# Patient Record
Sex: FEMALE | Race: WHITE | NOT HISPANIC OR LATINO | Employment: STUDENT | URBAN - METROPOLITAN AREA
[De-identification: names, ages, dates, MRNs, and addresses within clinical notes are randomized per-mention and may not be internally consistent; named-entity substitution may affect disease eponyms.]

---
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
## 2019-01-07 ENCOUNTER — OFFICE VISIT (OUTPATIENT)
Dept: OBSTETRICS AND GYNECOLOGY | Age: 19
End: 2019-01-07

## 2019-01-07 VITALS
WEIGHT: 138.6 LBS | SYSTOLIC BLOOD PRESSURE: 112 MMHG | DIASTOLIC BLOOD PRESSURE: 78 MMHG | HEIGHT: 66 IN | BODY MASS INDEX: 22.28 KG/M2

## 2019-01-07 DIAGNOSIS — Z11.3 SCREEN FOR STD (SEXUALLY TRANSMITTED DISEASE): ICD-10-CM

## 2019-01-07 DIAGNOSIS — R39.9 UTI SYMPTOMS: ICD-10-CM

## 2019-01-07 DIAGNOSIS — N83.209 CYST OF OVARY, UNSPECIFIED LATERALITY: ICD-10-CM

## 2019-01-07 DIAGNOSIS — R31.9 HEMATURIA, UNSPECIFIED TYPE: ICD-10-CM

## 2019-01-07 DIAGNOSIS — N92.1 MENORRHAGIA WITH IRREGULAR CYCLE: ICD-10-CM

## 2019-01-07 DIAGNOSIS — G89.29 CHRONIC SUPRAPUBIC PAIN: ICD-10-CM

## 2019-01-07 DIAGNOSIS — R10.2 CHRONIC SUPRAPUBIC PAIN: ICD-10-CM

## 2019-01-07 DIAGNOSIS — R10.2 PELVIC PAIN IN FEMALE: ICD-10-CM

## 2019-01-07 DIAGNOSIS — Z00.00 WELL WOMAN EXAM WITHOUT GYNECOLOGICAL EXAM: Primary | ICD-10-CM

## 2019-01-07 LAB
BILIRUB BLD-MCNC: ABNORMAL MG/DL
GLUCOSE UR STRIP-MCNC: NEGATIVE MG/DL
KETONES UR QL: NEGATIVE
LEUKOCYTE EST, POC: NEGATIVE
NITRITE UR-MCNC: NEGATIVE MG/ML
PH UR: 6 [PH] (ref 5–8)
PROT UR STRIP-MCNC: NEGATIVE MG/DL
RBC # UR STRIP: ABNORMAL /UL
SP GR UR: 1.03 (ref 1–1.03)
UROBILINOGEN UR QL: NORMAL

## 2019-01-07 PROCEDURE — 99395 PREV VISIT EST AGE 18-39: CPT | Performed by: OBSTETRICS & GYNECOLOGY

## 2019-01-07 PROCEDURE — 81002 URINALYSIS NONAUTO W/O SCOPE: CPT | Performed by: OBSTETRICS & GYNECOLOGY

## 2019-01-07 NOTE — PROGRESS NOTES
Chief complaint: annual    Subjective   History of Present Illness    Cassy Causey is a 18 y.o.  who presents for annual exam. She has had ongoing lower abdominal pain that she has thought were ovarian cysts. Her last US here 2017 was normal without ovarian cysts. She has had some small cysts noted on CT scan years ago. She takes LoLoestrin for menorrhagia, ovarian cysts, and contraception. She usually has amenorrhea with this pill but does have some spotting. She has seen Urology for kidney stones and has had CT scan that was normal for other issues. She has not had a scope. She usually has hematuria. Review of our labs does not show any urine culture, but UA positive only for blood.  Her menses are absent w/ OCPs.  Soc Hx- [employer] graduate, Freshman in college, going to [employer], plans to transfer to [employer] next year. Plans Art Education. Has a part time job ([employer] store)    Obstetric History:  OB History      Para Term  AB Living    0 0 0 0 0 0    SAB TAB Ectopic Molar Multiple Live Births    0 0 0   0           Menstrual History:     No LMP recorded (lmp unknown).         Current contraception: OCP (estrogen/progesterone)  History of abnormal Pap smear: no  Received Gardasil immunization: yes -    Perform regular self breast exam: yes -    Family history of uterine or ovarian cancer: no  Family History of colon cancer: no  Family history of breast cancer: no    Mammogram: not indicated.  Colonoscopy: not indicated.  DEXA: not indicated.    Exercise: moderately active  Calcium/Vitamin D: adequate intake    The following portions of the patient's history were reviewed and updated as appropriate: allergies, current medications, past family history, past medical history, past social history, past surgical history and problem list.    Review of Systems   Constitutional: Negative for activity change, fatigue, fever and unexpected weight change.   Respiratory: Negative for chest tightness and  "shortness of breath.    Cardiovascular: Negative for chest pain, palpitations and leg swelling.   Gastrointestinal: Negative for abdominal distention, abdominal pain, blood in stool, constipation, diarrhea, nausea and vomiting.   Endocrine: Negative for cold intolerance, heat intolerance, polydipsia, polyphagia and polyuria.   Genitourinary: Positive for dysuria and pelvic pain.        Blood in urine   Musculoskeletal: Negative for arthralgias and back pain.   Skin: Negative for color change.   Neurological: Negative for weakness and headaches.   Hematological: Does not bruise/bleed easily.   Psychiatric/Behavioral: Negative for confusion.       Pertinent items are noted in HPI.     Objective   Physical Exam    /78   Ht 167.6 cm (66\")   Wt 62.9 kg (138 lb 9.6 oz)   LMP  (LMP Unknown)   BMI 22.37 kg/m²     General:   alert, appears stated age and cooperative   Neck: no asymmetry, masses, or scars   Heart: regular rate and rhythm, S1, S2 normal, no murmur, click, rub or gallop   Lungs: clear to auscultation bilaterally   Abdomen: soft, non-tender, without masses or organomegaly, suprapubic tenderness   Breast: inspection negative, no nipple discharge or bleeding, no masses or nodularity palpable   Vulva: normal, Bartholin's, Urethra, [place]'s normal   Vagina: normal mucosa, suprapubic tenderness   Cervix: no cervical motion tenderness, no lesions and nulliparous appearance   Uterus: mobile, non-tender, normal shape and consistency, retroverted   Adnexa: normal adnexa and no mass, fullness, tenderness   Rectal: not indicated     Gyn US today uterus 4.5 x 4 x 2 cm ,EML 2.4mm, normal ovaries, no ovarian cysts noted, very small amt of free fluid in cul de sac  UA negative except blood    Assessment/Plan   Cassy was seen today for follow-up.    Diagnoses and all orders for this visit:    Well woman exam without gynecological exam    Menorrhagia with irregular cycle  -     US Non-ob Transvaginal  -     Norethin-Eth " Estrad-Fe Biphas (LO LOESTRIN FE) 1 MG-10 MCG / 10 MCG tablet; Take 1 tablet by mouth Daily.    Pelvic pain in female  -     US Non-ob Transvaginal  -     Chlamydia trachomatis, Neisseria gonorrhoeae, PCR - Urine, Urine, Clean Catch  -     Norethin-Eth Estrad-Fe Biphas (LO LOESTRIN FE) 1 MG-10 MCG / 10 MCG tablet; Take 1 tablet by mouth Daily.    Cyst of ovary, unspecified laterality  -     US Non-ob Transvaginal    UTI symptoms  -     POC Urinalysis Dipstick  -     Urine Culture - Urine, Urine, Clean Catch    Screen for STD (sexually transmitted disease)    Chronic suprapubic pain    Hematuria, unspecified type    history and exam suggestive of interstitial cystitis. Discussed dx and treatment with pt. Recommend she see Urogyn for evaluation and treatment. Urine culture pending. Samples of Uribel given. Discussed Elmiron medication for treatment. No ovarian cysts noted today.     Breast self exam technique reviewed and patient encouraged to perform self-exam monthly.  Discussed healthy lifestyle modifications.  Pap smear age 20\

## 2019-01-09 ENCOUNTER — TELEPHONE (OUTPATIENT)
Dept: OBSTETRICS AND GYNECOLOGY | Age: 19
End: 2019-01-09

## 2019-01-09 LAB
BACTERIA UR CULT: NORMAL
BACTERIA UR CULT: NORMAL
C TRACH RRNA SPEC QL NAA+PROBE: POSITIVE
N GONORRHOEA RRNA SPEC QL NAA+PROBE: NEGATIVE

## 2019-01-09 NOTE — TELEPHONE ENCOUNTER
----- Message from Rupal Gibson MD sent at 1/9/2019  8:42 AM EST -----  Urine culture negative for UTI

## 2019-01-10 DIAGNOSIS — A74.9 CHLAMYDIA: Primary | ICD-10-CM

## 2019-01-10 RX ORDER — AZITHROMYCIN 500 MG/1
1000 TABLET, FILM COATED ORAL ONCE
Qty: 2 TABLET | Refills: 0 | Status: SHIPPED | OUTPATIENT
Start: 2019-01-10 | End: 2019-01-10

## 2019-01-11 ENCOUNTER — TELEPHONE (OUTPATIENT)
Dept: OBSTETRICS AND GYNECOLOGY | Age: 19
End: 2019-01-11

## 2019-01-11 RX ORDER — AZITHROMYCIN 500 MG/1
1000 TABLET, FILM COATED ORAL ONCE
Qty: 2 TABLET | Refills: 0 | Status: SHIPPED | OUTPATIENT
Start: 2019-01-11 | End: 2019-01-11

## 2019-01-16 ENCOUNTER — TELEPHONE (OUTPATIENT)
Dept: OBSTETRICS AND GYNECOLOGY | Age: 19
End: 2019-01-16

## 2019-01-16 NOTE — TELEPHONE ENCOUNTER
Patient is following up with you from a conversation from last week. Would not leave any more detail.

## 2019-08-09 ENCOUNTER — LAB REQUISITION (OUTPATIENT)
Dept: LAB | Facility: HOSPITAL | Age: 19
End: 2019-08-09

## 2019-08-09 DIAGNOSIS — N20.9 URINARY CALCULUS: ICD-10-CM

## 2019-08-09 PROCEDURE — 82360 CALCULUS ASSAY QUANT: CPT

## 2019-08-16 LAB
CA PHOS CRY STONE QL IR: 5 %
COD CRY STONE QL IR: 25 %
COLOR STONE: NORMAL
COM CRY STONE QL IR: 70 %
COMPN STONE: NORMAL
Lab: NORMAL
Lab: NORMAL
NIDUS STONE QL: NORMAL
PATH REPORT.COMMENTS IMP SPEC: NORMAL
SIZE STONE: NORMAL MM
SURFACE CRYSTALS: NORMAL
WT STONE: 11.3 MG

## 2019-11-05 ENCOUNTER — TELEPHONE (OUTPATIENT)
Dept: OBSTETRICS AND GYNECOLOGY | Age: 19
End: 2019-11-05

## 2019-11-06 ENCOUNTER — OFFICE VISIT (OUTPATIENT)
Dept: OBSTETRICS AND GYNECOLOGY | Age: 19
End: 2019-11-06

## 2019-11-06 ENCOUNTER — PROCEDURE VISIT (OUTPATIENT)
Dept: OBSTETRICS AND GYNECOLOGY | Age: 19
End: 2019-11-06

## 2019-11-06 VITALS
HEIGHT: 66 IN | DIASTOLIC BLOOD PRESSURE: 60 MMHG | SYSTOLIC BLOOD PRESSURE: 112 MMHG | WEIGHT: 133 LBS | BODY MASS INDEX: 21.38 KG/M2

## 2019-11-06 DIAGNOSIS — R10.2 PELVIC PAIN IN FEMALE: Primary | ICD-10-CM

## 2019-11-06 DIAGNOSIS — N89.8 VAGINAL DISCHARGE: ICD-10-CM

## 2019-11-06 DIAGNOSIS — N83.209 CYST OF OVARY, UNSPECIFIED LATERALITY: ICD-10-CM

## 2019-11-06 DIAGNOSIS — N30.10 IC (INTERSTITIAL CYSTITIS): ICD-10-CM

## 2019-11-06 DIAGNOSIS — Z11.3 ENCOUNTER FOR SCREENING EXAMINATION FOR SEXUALLY TRANSMITTED DISEASE: Primary | ICD-10-CM

## 2019-11-06 DIAGNOSIS — R10.2 PELVIC PAIN: ICD-10-CM

## 2019-11-06 DIAGNOSIS — R30.0 BURNING WITH URINATION: ICD-10-CM

## 2019-11-06 LAB
BILIRUB BLD-MCNC: NEGATIVE MG/DL
CLARITY, POC: CLEAR
COLOR UR: ABNORMAL
GLUCOSE UR STRIP-MCNC: NEGATIVE MG/DL
KETONES UR QL: ABNORMAL
LEUKOCYTE EST, POC: NEGATIVE
NITRITE UR-MCNC: NEGATIVE MG/ML
PH UR: 5.5 [PH] (ref 5–8)
PROT UR STRIP-MCNC: NEGATIVE MG/DL
RBC # UR STRIP: NEGATIVE /UL
SP GR UR: 1.02 (ref 1–1.03)
UROBILINOGEN UR QL: NORMAL

## 2019-11-06 PROCEDURE — 76830 TRANSVAGINAL US NON-OB: CPT | Performed by: OBSTETRICS & GYNECOLOGY

## 2019-11-06 PROCEDURE — 99214 OFFICE O/P EST MOD 30 MIN: CPT | Performed by: OBSTETRICS & GYNECOLOGY

## 2019-11-06 PROCEDURE — 81002 URINALYSIS NONAUTO W/O SCOPE: CPT | Performed by: OBSTETRICS & GYNECOLOGY

## 2019-11-08 ENCOUNTER — TELEPHONE (OUTPATIENT)
Dept: OBSTETRICS AND GYNECOLOGY | Age: 19
End: 2019-11-08

## 2019-11-08 LAB
BACTERIA UR CULT: NORMAL
BACTERIA UR CULT: NORMAL

## 2019-11-11 ENCOUNTER — TELEPHONE (OUTPATIENT)
Dept: OBSTETRICS AND GYNECOLOGY | Age: 19
End: 2019-11-11

## 2019-11-11 LAB
A VAGINAE DNA VAG QL NAA+PROBE: NORMAL SCORE
BVAB2 DNA VAG QL NAA+PROBE: NORMAL SCORE
C ALBICANS DNA VAG QL NAA+PROBE: NEGATIVE
C GLABRATA DNA VAG QL NAA+PROBE: NEGATIVE
C TRACH RRNA SPEC QL NAA+PROBE: NEGATIVE
MEGA1 DNA VAG QL NAA+PROBE: NORMAL SCORE
N GONORRHOEA RRNA SPEC QL NAA+PROBE: NEGATIVE
T VAGINALIS RRNA SPEC QL NAA+PROBE: NEGATIVE

## 2019-11-14 PROBLEM — A74.9 CHLAMYDIA INFECTION: Status: RESOLVED | Noted: 2019-01-10 | Resolved: 2019-11-14

## 2019-11-14 NOTE — PROGRESS NOTES
"Subjective     Chief Complaint   Patient presents with   • Follow-up     Gyn follow up, U/S today @ 4:00 w/Cheri, c/o low right pain, bloating, burning w/urination, hx ovarian cyst       Cassy Causey is a 19 y.o.  whose LMP is Patient's last menstrual period was 10/26/2019 (exact date). presents with c/o pelvic pain and back pain, increased vaginal discharge and urinary frequency and dysuria. She has recurrent kidney stones and has seen Urology. Previously here, we discussed possible dx of interstitial cystis. I recommended that she see Urogyn for evaluation and treatment however she did not make the appt. Last visit, tested positive for Chlamydia and she was treated. She takes ocps and her menses are rare.      No Additional Complaints Reported    The following portions of the patient's history were reviewed and updated as appropriate:vital signs, allergies, current medications, past family history, past medical history, past social history, past surgical history and problem list      Review of Systems   A comprehensive review of systems was negative except for: Genitourinary: positive for frequency, dysuria, vaginal discharge  Musculoskeletal:positive for back pain     Objective      /60   Ht 167.6 cm (66\")   Wt 60.3 kg (133 lb)   LMP 10/26/2019 (Exact Date)   Breastfeeding? No   BMI 21.47 kg/m²     Physical Exam    General:   alert, appears stated age and no distress   Heart:    Lungs:    Breast:    Neck:    Abdomen:    CVA: absent   Pelvis: Vulva and vagina appear normal. Bimanual exam reveals normal uterus and adnexa.   Extremities: Extremities normal, atraumatic, no cyanosis or edema   Neurologic: negative   Psychiatric: Normal affect, judgement, and mood       Lab Review   Labs: {UA negative today except ketones    Imaging   Ultrasound - Pelvic Vaginal  Uterus 5.8 x 4.5 x 2.7 cm, EML 4.4 mm, normal ovaries, no free fluid or masses  Assessment/Plan     ASSESSMENT  1. Encounter for screening " examination for sexually transmitted disease    2. Burning with urination    3. Pelvic pain    4. Vaginal discharge    5. IC (interstitial cystitis)    chronic pelvic pain/back pain and urinary symptoms could be IC, could be adhesive disease due to h/o chlamydia or could be endometriosis.     PLAN  1.   Orders Placed This Encounter   Procedures   • Urine Culture - Urine, Urine, Clean Catch   • NuSwab VG+ - Swab, Vagina   • Ambulatory Referral to Gynecologic Urology   • POC Urinalysis Dipstick       2. Medications prescribed this encounter:        New Medications Ordered This Visit   Medications   • terconazole (TERAZOL 3) 0.8 % vaginal cream     Si applicator per vagina QHS x3 days     Dispense:  20 g     Refill:  0       Recommend evaluation with urogyn first for possible IC.    Follow up: annual and prn    Rupal Gibson MD  2019

## 2019-12-15 DIAGNOSIS — N92.1 MENORRHAGIA WITH IRREGULAR CYCLE: ICD-10-CM

## 2019-12-15 DIAGNOSIS — R10.2 PELVIC PAIN IN FEMALE: ICD-10-CM

## 2019-12-16 RX ORDER — NORETHINDRONE ACETATE AND ETHINYL ESTRADIOL, ETHINYL ESTRADIOL AND FERROUS FUMARATE 1MG-10(24)
KIT ORAL
Qty: 28 TABLET | Refills: 11 | Status: SHIPPED | OUTPATIENT
Start: 2019-12-16 | End: 2020-01-09 | Stop reason: SDUPTHER

## 2020-01-09 ENCOUNTER — OFFICE VISIT (OUTPATIENT)
Dept: OBSTETRICS AND GYNECOLOGY | Age: 20
End: 2020-01-09

## 2020-01-09 VITALS
BODY MASS INDEX: 21.24 KG/M2 | SYSTOLIC BLOOD PRESSURE: 104 MMHG | DIASTOLIC BLOOD PRESSURE: 60 MMHG | WEIGHT: 132.2 LBS | HEIGHT: 66 IN

## 2020-01-09 DIAGNOSIS — R10.2 PELVIC PAIN IN FEMALE: ICD-10-CM

## 2020-01-09 DIAGNOSIS — N92.1 MENORRHAGIA WITH IRREGULAR CYCLE: ICD-10-CM

## 2020-01-09 DIAGNOSIS — F41.9 ANXIETY: ICD-10-CM

## 2020-01-09 DIAGNOSIS — Z01.419 WELL WOMAN EXAM WITH ROUTINE GYNECOLOGICAL EXAM: Primary | ICD-10-CM

## 2020-01-09 PROCEDURE — 99395 PREV VISIT EST AGE 18-39: CPT | Performed by: OBSTETRICS & GYNECOLOGY

## 2020-01-09 RX ORDER — ESCITALOPRAM OXALATE 10 MG/1
10 TABLET ORAL DAILY
Qty: 30 TABLET | Refills: 11 | Status: SHIPPED | OUTPATIENT
Start: 2020-01-09 | End: 2020-01-30 | Stop reason: SINTOL

## 2020-01-09 NOTE — PROGRESS NOTES
"Chief complaint: annual    Subjective   History of Present Illness    Cassy Causey is a 19 y.o.  who presents for annual exam. 4 yr h/o pelvic pain, dysuria, hematuria and dyspareunia. H/o recurrent kidney stones and has been followed by urology and had a cysto. Just now referred to Dr Shelby of Urogynecology for eval and treatment (possible IC). Started on muscle relaxers, pyridium/uribel samples and will see allergist (?food allergies). Will also consider pelvic PT.  Her menses are absent w/ lo loestrin. Happy with pills. She would like to restart Lexapro for anxiety.  Soc Hx- sophomore in college, also works full time  2019 GC/Chl/trich negative    Obstetric History:  OB History        0    Para   0    Term   0       0    AB   0    Living   0       SAB   0    TAB   0    Ectopic   0    Molar        Multiple   0    Live Births                   Menstrual History:     No LMP recorded. (Menstrual status: Oral contraceptives).         Current contraception: OCP (estrogen/progesterone)  Exercise: moderately active  Calcium/Vitamin D: adequate intake    The following portions of the patient's history were reviewed and updated as appropriate: allergies, current medications, past family history, past medical history, past social history, past surgical history and problem list.    Review of Systems   Constitutional: Negative.    Respiratory: Negative.    Cardiovascular: Negative.    Gastrointestinal: Negative.    Genitourinary: Positive for dyspareunia, dysuria, frequency and pelvic pain.   Musculoskeletal: Negative.    Skin: Negative.    Neurological: Negative.    Psychiatric/Behavioral: The patient is nervous/anxious.        Pertinent items are noted in HPI.     Objective   Physical Exam    /60   Ht 167.6 cm (66\")   Wt 60 kg (132 lb 3.2 oz)   Breastfeeding No   BMI 21.34 kg/m²     General:   alert, appears stated age and cooperative   Neck: no asymmetry, masses, or scars   Heart: regular " rate and rhythm, S1, S2 normal, no murmur, click, rub or gallop   Lungs: clear to auscultation bilaterally   Abdomen: soft, non-tender, without masses or organomegaly   Breast: inspection negative, no nipple discharge or bleeding, no masses or nodularity palpable   Vulva: Bartholin's, Urethra, [place]'s normal   Vagina: normal mucosa   Cervix: no cervical motion tenderness, no lesions and nulliparous appearance   Uterus: normal size, mobile, non-tender, normal shape and consistency, retroverted   Adnexa: normal adnexa and no mass, fullness, tenderness   Rectal: not indicated     Assessment/Plan   Cassy was seen today for gynecologic exam.    Diagnoses and all orders for this visit:    Well woman exam with routine gynecological exam    Menorrhagia with irregular cycle  -     Norethin-Eth Estrad-Fe Biphas (LO LOESTRIN FE) 1 MG-10 MCG / 10 MCG tablet; Take 1 tablet by mouth Daily.    Pelvic pain in female  -     Norethin-Eth Estrad-Fe Biphas (LO LOESTRIN FE) 1 MG-10 MCG / 10 MCG tablet; Take 1 tablet by mouth Daily.    Anxiety  -     escitalopram (LEXAPRO) 10 MG tablet; Take 1 tablet by mouth Daily.    continue evaluation and treatment with Urogyn regarding dysuria, hematuria, dyspareunia and pelvic pain    Breast self exam technique reviewed and patient encouraged to perform self-exam monthly.  Discussed healthy lifestyle modifications.  Pap smear age 20

## 2020-01-30 ENCOUNTER — TELEPHONE (OUTPATIENT)
Dept: OBSTETRICS AND GYNECOLOGY | Age: 20
End: 2020-01-30

## 2020-01-30 DIAGNOSIS — F41.9 ANXIETY: ICD-10-CM

## 2020-01-30 RX ORDER — AMITRIPTYLINE HYDROCHLORIDE 25 MG/1
25 TABLET, FILM COATED ORAL NIGHTLY
Status: CANCELLED | OUTPATIENT
Start: 2020-01-30

## 2020-12-06 DIAGNOSIS — R10.2 PELVIC PAIN IN FEMALE: ICD-10-CM

## 2020-12-06 DIAGNOSIS — N92.1 MENORRHAGIA WITH IRREGULAR CYCLE: ICD-10-CM

## 2020-12-07 RX ORDER — NORETHINDRONE ACETATE AND ETHINYL ESTRADIOL, ETHINYL ESTRADIOL AND FERROUS FUMARATE 1MG-10(24)
KIT ORAL
Qty: 28 TABLET | Refills: 3 | Status: SHIPPED | OUTPATIENT
Start: 2020-12-07 | End: 2021-01-29 | Stop reason: SDUPTHER

## 2021-01-29 ENCOUNTER — OFFICE VISIT (OUTPATIENT)
Dept: OBSTETRICS AND GYNECOLOGY | Age: 21
End: 2021-01-29

## 2021-01-29 VITALS
WEIGHT: 125.6 LBS | BODY MASS INDEX: 20.18 KG/M2 | HEIGHT: 66 IN | DIASTOLIC BLOOD PRESSURE: 60 MMHG | SYSTOLIC BLOOD PRESSURE: 102 MMHG

## 2021-01-29 DIAGNOSIS — F32.A DEPRESSION, UNSPECIFIED DEPRESSION TYPE: ICD-10-CM

## 2021-01-29 DIAGNOSIS — Z11.3 SCREEN FOR STD (SEXUALLY TRANSMITTED DISEASE): ICD-10-CM

## 2021-01-29 DIAGNOSIS — Z01.419 WELL WOMAN EXAM WITH ROUTINE GYNECOLOGICAL EXAM: Primary | ICD-10-CM

## 2021-01-29 DIAGNOSIS — R10.2 PELVIC PAIN IN FEMALE: ICD-10-CM

## 2021-01-29 DIAGNOSIS — N92.1 MENORRHAGIA WITH IRREGULAR CYCLE: ICD-10-CM

## 2021-01-29 PROBLEM — N30.10 IC (INTERSTITIAL CYSTITIS): Status: ACTIVE | Noted: 2021-01-29

## 2021-01-29 PROCEDURE — 99395 PREV VISIT EST AGE 18-39: CPT | Performed by: OBSTETRICS & GYNECOLOGY

## 2021-01-29 RX ORDER — NORETHINDRONE ACETATE AND ETHINYL ESTRADIOL, ETHINYL ESTRADIOL AND FERROUS FUMARATE 1MG-10(24)
1 KIT ORAL DAILY
Qty: 84 TABLET | Refills: 3 | Status: SHIPPED | OUTPATIENT
Start: 2021-01-29 | End: 2021-06-17 | Stop reason: SINTOL

## 2021-01-29 RX ORDER — NORETHINDRONE ACETATE AND ETHINYL ESTRADIOL, ETHINYL ESTRADIOL AND FERROUS FUMARATE 1MG-10(24)
KIT ORAL
COMMUNITY
Start: 2015-10-15 | End: 2021-01-29 | Stop reason: SDUPTHER

## 2021-01-29 RX ORDER — AMITRIPTYLINE HYDROCHLORIDE 25 MG/1
50 TABLET, FILM COATED ORAL NIGHTLY
Qty: 30 TABLET | Refills: 11 | Status: SHIPPED | OUTPATIENT
Start: 2021-01-29 | End: 2021-07-30

## 2021-01-29 NOTE — PROGRESS NOTES
"Chief complaint: annual    Subjective   History of Present Illness    Cassy Causey is a 20 y.o.  who presents for annual exam. Doing well. Happy w/ lo loestrin for menorrhagia w/ irregular cycle and pelvic pain. Amenorrhea w/ pills. No pain currently. Followed by Dr Shelby for IC. tx w/ bladder instillations w/ good relief. dc'd lexapro due to bladder issues. Urogyn recommended Elavil for depression/anxiety.  Her menses are absent  Soc hx- in college at [employer]    Obstetric History:  OB History        0    Para   0    Term   0       0    AB   0    Living   0       SAB   0    TAB   0    Ectopic   0    Molar        Multiple   0    Live Births                   Menstrual History:     No LMP recorded (lmp unknown). (Menstrual status: Oral contraceptives).         Current contraception: OCP (estrogen/progesterone)  History of abnormal Pap sm ear: no    Perform regular self breast exam: no  Family history of uterine or ovarian cancer: no  Family History of colon cancer: no  Family history of breast cancer: no    Mammogram: not indicated.  Colonoscopy: not indicated.  DEXA: not indicated.    Exercise: moderately active  Calcium/Vitamin D: adequate intake    The following portions of the patient's history were reviewed and updated as appropriate: allergies, current medications, past family history, past medical history, past social history, past surgical history and problem list.    Review of Systems   Constitutional: Negative.    Respiratory: Negative.    Cardiovascular: Negative.    Gastrointestinal: Negative.    Genitourinary: Negative.    Musculoskeletal: Negative.    Psychiatric/Behavioral: Positive for agitation and dysphoric mood.       Pertinent items are noted in HPI.     Objective   Physical Exam    /60   Ht 167.6 cm (66\")   Wt 57 kg (125 lb 9.6 oz)   LMP  (LMP Unknown)   Breastfeeding No   BMI 20.27 kg/m²     General:   alert, appears stated age and cooperative   Neck: no asymmetry, " masses, or scars   Heart: regular rate and rhythm, S1, S2 normal, no murmur, click, rub or gallop   Lungs: clear to auscultation bilaterally   Abdomen: soft, non-tender, without masses or organomegaly   Breast: inspection negative, no nipple discharge or bleeding, no masses or nodularity palpable   Vulva: normal, Bartholin's, Urethra, [place]'s normal   Vagina: normal mucosa   Cervix: no bleeding following Pap, no cervical motion tenderness, no lesions and nulliparous appearance   Uterus: normal size, mobile, non-tender, normal shape and consistency   Adnexa: normal adnexa and no mass, fullness, tenderness   Rectal: not indicated     Assessment/Plan   Diagnoses and all orders for this visit:    1. Well woman exam with routine gynecological exam (Primary)  -     IGP,CtNgTv,rfx Aptima HPV ASCU    2. Screen for STD (sexually transmitted disease)  -     IGP,CtNgTv,rfx Aptima HPV ASCU    3. Depression, unspecified depression type  -     amitriptyline (ELAVIL) 25 MG tablet; Take 2 tablets by mouth Every Night.  Dispense: 30 tablet; Refill: 11    4. Menorrhagia with irregular cycle  -     Norethin-Eth Estrad-Fe Biphas (Lo Loestrin Fe) 1 MG-10 MCG / 10 MCG tablet; Take 1 tablet by mouth Daily.  Dispense: 84 tablet; Refill: 3    5. Pelvic pain in female  -     Norethin-Eth Estrad-Fe Biphas (Lo Loestrin Fe) 1 MG-10 MCG / 10 MCG tablet; Take 1 tablet by mouth Daily.  Dispense: 84 tablet; Refill: 3    she will call after trying elavil 50 mg, can increase dose after 1 mo if needed  Pelvic pain well controlled w/ ocps    Breast self exam technique reviewed and patient encouraged to perform self-exam monthly.  Discussed healthy lifestyle modifications.  Pap smear sent

## 2021-02-02 LAB
C TRACH RRNA CVX QL NAA+PROBE: NEGATIVE
CONV .: NORMAL
CYTOLOGIST CVX/VAG CYTO: NORMAL
CYTOLOGY CVX/VAG DOC CYTO: NORMAL
CYTOLOGY CVX/VAG DOC THIN PREP: NORMAL
DX ICD CODE: NORMAL
HIV 1 & 2 AB SER-IMP: NORMAL
N GONORRHOEA RRNA CVX QL NAA+PROBE: NEGATIVE
OTHER STN SPEC: NORMAL
STAT OF ADQ CVX/VAG CYTO-IMP: NORMAL
T VAGINALIS RRNA SPEC QL NAA+PROBE: NEGATIVE

## 2021-02-03 ENCOUNTER — TELEPHONE (OUTPATIENT)
Dept: OBSTETRICS AND GYNECOLOGY | Age: 21
End: 2021-02-03

## 2021-02-04 ENCOUNTER — TELEPHONE (OUTPATIENT)
Dept: OBSTETRICS AND GYNECOLOGY | Age: 21
End: 2021-02-04

## 2021-06-15 ENCOUNTER — TELEPHONE (OUTPATIENT)
Dept: OBSTETRICS AND GYNECOLOGY | Age: 21
End: 2021-06-15

## 2021-06-15 RX ORDER — PHENAZOPYRIDINE HYDROCHLORIDE 200 MG/1
200 TABLET, FILM COATED ORAL 2 TIMES DAILY
Qty: 4 TABLET | Refills: 0 | Status: SHIPPED | OUTPATIENT
Start: 2021-06-15 | End: 2021-06-17

## 2021-06-15 NOTE — TELEPHONE ENCOUNTER
Pt notified, also pt called back they are going to be in town on Thursday and scheduled them to come in for UA/Culture.

## 2021-06-15 NOTE — TELEPHONE ENCOUNTER
I sent in pyridium for her. Just let her know that I would prefer she get a UA and culture to r/o infection.

## 2021-06-15 NOTE — TELEPHONE ENCOUNTER
She has a pretty extensive bladder history. If she is having pain, I would suggest she c/I and leave a urine specimen so we can check for infection. Then I can send in [facility] for her

## 2021-06-15 NOTE — TELEPHONE ENCOUNTER
Pt is in [place] for school, I instructed her to find a BH around there to complete urine sample.  Pt will let us know, or if she will be able to come here.

## 2021-06-17 ENCOUNTER — TELEPHONE (OUTPATIENT)
Dept: GASTROENTEROLOGY | Facility: CLINIC | Age: 21
End: 2021-06-17

## 2021-06-17 ENCOUNTER — OFFICE VISIT (OUTPATIENT)
Dept: OBSTETRICS AND GYNECOLOGY | Age: 21
End: 2021-06-17

## 2021-06-17 ENCOUNTER — OFFICE VISIT (OUTPATIENT)
Dept: GASTROENTEROLOGY | Facility: CLINIC | Age: 21
End: 2021-06-17

## 2021-06-17 VITALS
HEIGHT: 66 IN | BODY MASS INDEX: 20.7 KG/M2 | WEIGHT: 128.8 LBS | TEMPERATURE: 97.6 F | SYSTOLIC BLOOD PRESSURE: 110 MMHG | DIASTOLIC BLOOD PRESSURE: 70 MMHG

## 2021-06-17 VITALS
BODY MASS INDEX: 20.57 KG/M2 | SYSTOLIC BLOOD PRESSURE: 118 MMHG | HEIGHT: 66 IN | DIASTOLIC BLOOD PRESSURE: 70 MMHG | WEIGHT: 128 LBS

## 2021-06-17 DIAGNOSIS — R10.33 PERIUMBILICAL ABDOMINAL PAIN: Primary | ICD-10-CM

## 2021-06-17 DIAGNOSIS — R11.10 INTRACTABLE VOMITING, PRESENCE OF NAUSEA NOT SPECIFIED, UNSPECIFIED VOMITING TYPE: ICD-10-CM

## 2021-06-17 DIAGNOSIS — R10.33 PERIUMBILICAL ABDOMINAL PAIN: ICD-10-CM

## 2021-06-17 DIAGNOSIS — Z13.89 SCREENING FOR BLOOD OR PROTEIN IN URINE: Primary | ICD-10-CM

## 2021-06-17 DIAGNOSIS — R11.2 INTRACTABLE VOMITING WITH NAUSEA, UNSPECIFIED VOMITING TYPE: ICD-10-CM

## 2021-06-17 DIAGNOSIS — R82.90 BAD ODOR OF URINE: ICD-10-CM

## 2021-06-17 DIAGNOSIS — R19.8 ALTERNATING CONSTIPATION AND DIARRHEA: ICD-10-CM

## 2021-06-17 LAB
BILIRUB BLD-MCNC: NEGATIVE MG/DL
CLARITY, POC: CLEAR
COLOR UR: YELLOW
GLUCOSE UR STRIP-MCNC: NEGATIVE MG/DL
KETONES UR QL: NEGATIVE
LEUKOCYTE EST, POC: NEGATIVE
NITRITE UR-MCNC: NEGATIVE MG/ML
PH UR: 6 [PH] (ref 5–8)
PROT UR STRIP-MCNC: NEGATIVE MG/DL
RBC # UR STRIP: NEGATIVE /UL
SP GR UR: 1.02 (ref 1–1.03)
UROBILINOGEN UR QL: NORMAL

## 2021-06-17 PROCEDURE — 99214 OFFICE O/P EST MOD 30 MIN: CPT | Performed by: PHYSICIAN ASSISTANT

## 2021-06-17 PROCEDURE — 81003 URINALYSIS AUTO W/O SCOPE: CPT | Performed by: PHYSICIAN ASSISTANT

## 2021-06-17 PROCEDURE — 99204 OFFICE O/P NEW MOD 45 MIN: CPT | Performed by: INTERNAL MEDICINE

## 2021-06-17 RX ORDER — NORETHINDRONE ACETATE AND ETHINYL ESTRADIOL AND FERROUS FUMARATE 1MG-20(24)
1 KIT ORAL DAILY
Qty: 28 TABLET | Refills: 12 | Status: SHIPPED | OUTPATIENT
Start: 2021-06-17 | End: 2022-06-17

## 2021-06-17 NOTE — PROGRESS NOTES
Chief Complaint   Patient presents with   • Abdominal Pain   • Bloated       History of Present Illness:   20 y.o. female who c/o a many year h/o abdominal pain, in the epigastric, periumbilical pain and RLQ abdominal pain.  She has kidney stones. She is irregular and has constipated. She may have diarrhea also after having no BM x 5 days. NO rectal bleeding. She doesn't know what makes her abdomen hurt or what makes it better. HEr stomach pain may awaken her. No period x 2 yrs from [facility]. No association with menses that she knows of. She may vomit if she hasn't moved her bowels in several days. NO fevers, chills, night sweats. Weight stable. Nonsmoker. RAre ETOH. 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 Works as . Doesn't want Covid vaccine. 3 of her uncles were paralyzed from flu vaccine. She has burning on urination. H/o kidney stones.         She had a CT abd/pelvis in 2019 at [facility] that showed:  IMPRESSION:   1. Numerous small nonobstructing bilateral renal calculi have mildly increased size and number since 2016.   2. A new 4 mm right pelvic sidewall level calculus could well be within the nondilated distal right ureter. Clinical correlation is recommended.   3. No urinary bladder stones disease is evident.        No FH of crohn's or UC. No hematuria now.     Past Medical History:   Diagnosis Date   • Anxiety    • Chlamydia    • IC (interstitial cystitis)    • Kidney stone    • Migraine        Past Surgical History:   Procedure Laterality Date   • KIDNEY STONE SURGERY Right 08/2019    Body rejected stent that was placed.         Current Outpatient Medications:   •  Norethin-Eth Estrad-Fe Biphas (Lo Loestrin Fe) 1 MG-10 MCG / 10 MCG tablet, Take 1 tablet by mouth Daily., Disp: 84 tablet, Rfl: 3  •  phenazopyridine (Pyridium) 200 MG tablet, Take 1 tablet by mouth 2 (two) times a day for 2 days., Disp: 4 tablet, Rfl: 0  •  amitriptyline (ELAVIL) 25 MG tablet, Take 2 tablets by mouth Every  Night., Disp: 30 tablet, Rfl: 11    No Known Allergies    Family History   Problem Relation Age of Onset   • Brain cancer Father    • Hypertension Father    • Hypertension Paternal Grandfather    • Diabetes Maternal Grandfather    • Hypertension Maternal Grandfather    • Breast cancer Neg Hx    • Ovarian cancer Neg Hx    • Uterine cancer Neg Hx    • Colon cancer Neg Hx    • Deep vein thrombosis Neg Hx    • Pulmonary embolism Neg Hx        Social History     Socioeconomic History   • Marital status: Single     Spouse name: Not on file   • Number of children: Not on file   • Years of education: Not on file   • Highest education level: Not on file   Tobacco Use   • Smoking status: Never Smoker   • Smokeless tobacco: Never Used   Substance and Sexual Activity   • Alcohol use: No   • Drug use: No   • Sexual activity: Yes     Partners: Male     Birth control/protection: OCP       Review of Systems   Gastrointestinal: Positive for abdominal pain, constipation and diarrhea.     Pertinent positives and negatives documented in the HPI and all other systems reviewed and were found to be negative.  Vitals:    06/17/21 1044   BP: 110/70   Temp: 97.6 °F (36.4 °C)       Physical Exam  Vitals reviewed.   Constitutional:       General: She is not in acute distress.     Appearance: Normal appearance. She is well-developed. She is not diaphoretic.   HENT:      Head: Normocephalic and atraumatic. Hair is normal.      Right Ear: Hearing, tympanic membrane, ear canal and external ear normal. No decreased hearing noted. No drainage.      Left Ear: Hearing, tympanic membrane, ear canal and external ear normal. No decreased hearing noted.      Nose: Nose normal. No nasal deformity.      Mouth/Throat:      Mouth: Mucous membranes are moist.   Eyes:      General: Lids are normal.         Right eye: No discharge.         Left eye: No discharge.      Extraocular Movements: Extraocular movements intact.      Conjunctiva/sclera: Conjunctivae  normal.      Pupils: Pupils are equal, round, and reactive to light.   Neck:      Thyroid: No thyromegaly.      Vascular: No JVD.      Trachea: No tracheal deviation.   Cardiovascular:      Rate and Rhythm: Normal rate and regular rhythm.      Pulses: Normal pulses.      Heart sounds: Normal heart sounds. No murmur heard.   No friction rub. No gallop.    Pulmonary:      Effort: Pulmonary effort is normal. No respiratory distress.      Breath sounds: Normal breath sounds. No wheezing or rales.   Chest:      Chest wall: No tenderness.   Abdominal:      General: Bowel sounds are normal. There is no distension.      Palpations: Abdomen is soft. There is no mass.      Tenderness: There is no abdominal tenderness. There is no guarding or rebound.      Hernia: No hernia is present.   Musculoskeletal:         General: No tenderness or deformity. Normal range of motion.      Cervical back: Normal range of motion and neck supple.   Lymphadenopathy:      Cervical: No cervical adenopathy.   Skin:     General: Skin is warm and dry.      Findings: No erythema or rash.   Neurological:      Mental Status: She is alert and oriented to person, place, and time.      Cranial Nerves: No cranial nerve deficit.      Motor: No abnormal muscle tone.      Coordination: Coordination normal.      Deep Tendon Reflexes: Reflexes are normal and symmetric. Reflexes normal.   Psychiatric:         Mood and Affect: Mood normal.         Behavior: Behavior normal.         Thought Content: Thought content normal.         Judgment: Judgment normal.         Diagnoses and all orders for this visit:    1. Periumbilical abdominal pain (Primary)  -     Amylase  -     CBC & Differential  -     Celiac Ab tTG DGP TIgA  -     Comprehensive Metabolic Panel  -     Lipase  -     TSH  -     Sedimentation Rate  -     C-reactive Protein  -     Case Request; Standing  -     Case Request    2. Intractable vomiting with nausea, unspecified vomiting type  -     Amylase  -      CBC & Differential  -     Celiac Ab tTG DGP TIgA  -     Comprehensive Metabolic Panel  -     Lipase  -     TSH  -     Sedimentation Rate  -     C-reactive Protein  -     Case Request; Standing  -     Case Request    3. Alternating constipation and diarrhea  -     Amylase  -     CBC & Differential  -     Celiac Ab tTG DGP TIgA  -     Comprehensive Metabolic Panel  -     Lipase  -     TSH  -     Sedimentation Rate  -     C-reactive Protein  -     Case Request; Standing  -     Case Request    Other orders  -     Follow Anesthesia Guidelines / Standing Orders; Future  -     Obtain Informed Consent; Future  -     Implement Anesthesia orders day of procedure.; Standing  -     Obtain informed consent; Standing  -     Verify bowel prep was successful; Standing  -     Give tap water enema if bowel prep was insufficient; Standing      Assessment:  1. Abdominal pain  2. Constipation alternating with diarrhea.   3. H/o kidney stones.  4. Intermittent vomiting.      Recommendations:  1. Labs:   2. EGD and colonoscopy. Do these this summer.     Return Schedule EGD and colonoscopy this summer..    Caesar Sorensen MD  6/17/2021

## 2021-06-17 NOTE — TELEPHONE ENCOUNTER
----- Message from Francis Brenner Rep sent at 6/17/2021  2:42 PM EDT -----  Regarding: Questions  Contact: [phone]  Pt is needing to speak to you

## 2021-06-17 NOTE — TELEPHONE ENCOUNTER
Called pt and pt wanted to let Dr Sorensen know that she had her urine specimen and her labs done at her gyn office and they are to send the results to Dr Sorensen.   Update sent to Dr Sorensen.

## 2021-06-17 NOTE — PROGRESS NOTES
"Subjective     Chief Complaint   Patient presents with   • Urinary Tract Infection     burning with urination (with odor and cloudy)       Cassy Causey is a 20 y.o.  whose LMP is No LMP recorded (lmp unknown). (Menstrual status: Oral contraceptives). presents with uti sx's    She is noting odor to her urine  Has h/o uti issues  Sees Urologist and was thinking to call their office as well  Denies std risk, monogamous    She called the other day with sx's and I sent in pyridium    She is here for UA    Was also seen at Dr Sorensen's office before t his visit  Was planning to have labs there but waited 45 minutes only to be told the lab lady was at lunch  Pt left to come to this visit  Would like us to draw her labs for her    Also noting some hormonal changes   Feels best the week after her period  Then has fatigue, skin changes and greasy hair  Wonders about a new bcp  Has been on this one for years    Lives in [place], works at [employer]  Attends [employer]  Will graduate this year    No Additional Complaints Reported    The following portions of the patient's history were reviewed and updated as appropriate:vital signs, allergies, current medications, past family history, past medical history, past social history, past surgical history and problem list      Review of Systems   Genitourinary:positive for dysuria     Objective      /70   Ht 167.6 cm (66\")   Wt 58.1 kg (128 lb)   LMP  (LMP Unknown)   Breastfeeding No   BMI 20.66 kg/m²     Physical Exam    General:   alert, comfortable and no distress   Heart: Not performed today   Lungs: Not performed today.   Breast: Not performed today   Neck: na   Abdomen: {Not performed today   CVA: Not performed today   Pelvis: Not performed today   Extremities: Not performed today   Neurologic: negative   Psychiatric: Normal affect, judgement, and mood       Lab Review   Labs: Urinalysis - with micro     Imaging   No data reviewed    Assessment/Plan "     ASSESSMENT  1. Screening for blood or protein in urine    2. Periumbilical abdominal pain    3. Alternating constipation and diarrhea    4. Intractable vomiting, presence of nausea not specified, unspecified vomiting type    5. Bad odor of urine        PLAN  1.   Orders Placed This Encounter   Procedures   • Urine Culture - Urine, Urine, Clean Catch   • Amylase   • Lipase   • Comprehensive Metabolic Panel   • C-reactive Protein   • Sedimentation Rate   • Celiac Ab tTG DGP TIgA   • POC Urinalysis Dipstick, Multipro   • CBC & Differential       2. Labs ordered for Dr Sorensen    3. Will send urine culture. Disc BC options. Will try loestrin. Wait for labs prior to starting new meds.  Could consider LARC as well. HO given to pt    Follow up: SAGAR Ramos  6/17/2021

## 2021-06-18 LAB
ALBUMIN SERPL-MCNC: 5.1 G/DL (ref 3.5–5.2)
ALBUMIN/GLOB SERPL: 2 G/DL
ALP SERPL-CCNC: 53 U/L (ref 39–117)
ALT SERPL-CCNC: 11 U/L (ref 1–33)
AMYLASE SERPL-CCNC: 41 U/L (ref 28–100)
AST SERPL-CCNC: 14 U/L (ref 1–32)
BASOPHILS # BLD AUTO: NORMAL 10*3/UL
BILIRUB SERPL-MCNC: 0.5 MG/DL (ref 0–1.2)
BUN SERPL-MCNC: 10 MG/DL (ref 6–20)
BUN/CREAT SERPL: 12 (ref 7–25)
CALCIUM SERPL-MCNC: 9.8 MG/DL (ref 8.6–10.5)
CHLORIDE SERPL-SCNC: 101 MMOL/L (ref 98–107)
CO2 SERPL-SCNC: 25.6 MMOL/L (ref 22–29)
CREAT SERPL-MCNC: 0.83 MG/DL (ref 0.57–1)
CRP SERPL-MCNC: 0.82 MG/DL (ref 0–0.5)
DIFFERENTIAL COMMENT: ABNORMAL
EOSINOPHIL # BLD AUTO: NORMAL 10*3/UL
EOSINOPHIL NFR BLD AUTO: NORMAL %
ERYTHROCYTE [DISTWIDTH] IN BLOOD BY AUTOMATED COUNT: 13 % (ref 12.3–15.4)
ERYTHROCYTE [SEDIMENTATION RATE] IN BLOOD BY WESTERGREN METHOD: 5 MM/HR (ref 0–20)
GLIADIN PEPTIDE IGA SER-ACNC: 9 UNITS (ref 0–19)
GLIADIN PEPTIDE IGG SER-ACNC: 2 UNITS (ref 0–19)
GLOBULIN SER CALC-MCNC: 2.6 GM/DL
GLUCOSE SERPL-MCNC: 104 MG/DL (ref 65–99)
HCT VFR BLD AUTO: 40.9 % (ref 34–46.6)
HGB BLD-MCNC: 13.3 G/DL (ref 12–15.9)
IGA SERPL-MCNC: 254 MG/DL (ref 87–352)
LIPASE SERPL-CCNC: 26 U/L (ref 13–60)
LYMPHOCYTES # BLD AUTO: NORMAL 10*3/UL
LYMPHOCYTES # BLD MANUAL: 0.74 10*3/MM3 (ref 0.7–3.1)
LYMPHOCYTES NFR BLD AUTO: NORMAL %
LYMPHOCYTES NFR BLD MANUAL: 10 % (ref 19.6–45.3)
MCH RBC QN AUTO: 28.5 PG (ref 26.6–33)
MCHC RBC AUTO-ENTMCNC: 32.5 G/DL (ref 31.5–35.7)
MCV RBC AUTO: 87.6 FL (ref 79–97)
MONOCYTES # BLD MANUAL: 0.37 10*3/MM3 (ref 0.1–0.9)
MONOCYTES NFR BLD AUTO: NORMAL %
MONOCYTES NFR BLD MANUAL: 5 % (ref 5–12)
NEUTROPHILS # BLD MANUAL: 6.25 10*3/MM3 (ref 1.7–7)
NEUTROPHILS NFR BLD AUTO: NORMAL %
NEUTROPHILS NFR BLD MANUAL: 85 % (ref 42.7–76)
PLATELET # BLD AUTO: 207 10*3/MM3 (ref 140–450)
PLATELET BLD QL SMEAR: ABNORMAL
POTASSIUM SERPL-SCNC: 4.1 MMOL/L (ref 3.5–5.2)
PROT SERPL-MCNC: 7.7 G/DL (ref 6–8.5)
RBC # BLD AUTO: 4.67 10*6/MM3 (ref 3.77–5.28)
RBC MORPH BLD: ABNORMAL
SODIUM SERPL-SCNC: 142 MMOL/L (ref 136–145)
TTG IGA SER-ACNC: <2 U/ML (ref 0–3)
TTG IGG SER-ACNC: <2 U/ML (ref 0–5)
WBC # BLD AUTO: 7.35 10*3/MM3 (ref 3.4–10.8)

## 2021-06-19 LAB
BACTERIA UR CULT: NO GROWTH
BACTERIA UR CULT: NORMAL

## 2021-06-21 ENCOUNTER — TELEPHONE (OUTPATIENT)
Dept: OBSTETRICS AND GYNECOLOGY | Age: 21
End: 2021-06-21

## 2021-06-21 NOTE — TELEPHONE ENCOUNTER
----- Message from SAGAR Penny sent at 6/21/2021  9:35 AM EDT -----  Let her know her labs all look pretty good except for a slightly elevated C reactive protein. I will send these results to Dr hurtado to review as well. Her urine culture is negative for infection.

## 2021-08-02 ENCOUNTER — TRANSCRIBE ORDERS (OUTPATIENT)
Dept: SLEEP MEDICINE | Facility: HOSPITAL | Age: 21
End: 2021-08-02

## 2021-08-02 ENCOUNTER — HOSPITAL ENCOUNTER (OUTPATIENT)
Facility: HOSPITAL | Age: 21
Setting detail: HOSPITAL OUTPATIENT SURGERY
Discharge: HOME | End: 2021-08-02
Attending: INTERNAL MEDICINE | Admitting: INTERNAL MEDICINE

## 2021-08-02 ENCOUNTER — LAB (OUTPATIENT)
Dept: LAB | Facility: HOSPITAL | Age: 21
End: 2021-08-02

## 2021-08-02 ENCOUNTER — ANESTHESIA EVENT (OUTPATIENT)
Dept: GASTROENTEROLOGY | Facility: HOSPITAL | Age: 21
End: 2021-08-02

## 2021-08-02 ENCOUNTER — ANESTHESIA (OUTPATIENT)
Dept: GASTROENTEROLOGY | Facility: HOSPITAL | Age: 21
End: 2021-08-02

## 2021-08-02 VITALS
SYSTOLIC BLOOD PRESSURE: 118 MMHG | OXYGEN SATURATION: 97 % | WEIGHT: 129.2 LBS | RESPIRATION RATE: 16 BRPM | HEART RATE: 75 BPM | BODY MASS INDEX: 20.28 KG/M2 | TEMPERATURE: 97.9 F | DIASTOLIC BLOOD PRESSURE: 77 MMHG | HEIGHT: 67 IN

## 2021-08-02 DIAGNOSIS — R19.8 ALTERNATING CONSTIPATION AND DIARRHEA: ICD-10-CM

## 2021-08-02 DIAGNOSIS — Z01.818 OTHER SPECIFIED PRE-OPERATIVE EXAMINATION: Primary | ICD-10-CM

## 2021-08-02 DIAGNOSIS — R11.2 INTRACTABLE VOMITING WITH NAUSEA, UNSPECIFIED VOMITING TYPE: ICD-10-CM

## 2021-08-02 DIAGNOSIS — R10.33 PERIUMBILICAL ABDOMINAL PAIN: ICD-10-CM

## 2021-08-02 LAB
B-HCG UR QL: NEGATIVE
INTERNAL NEGATIVE CONTROL: NEGATIVE
INTERNAL POSITIVE CONTROL: POSITIVE
Lab: NORMAL
SARS-COV-2 RNA RESP QL NAA+PROBE: NOT DETECTED

## 2021-08-02 PROCEDURE — 43239 EGD BIOPSY SINGLE/MULTIPLE: CPT | Performed by: INTERNAL MEDICINE

## 2021-08-02 PROCEDURE — 88305 TISSUE EXAM BY PATHOLOGIST: CPT | Performed by: INTERNAL MEDICINE

## 2021-08-02 PROCEDURE — 81025 URINE PREGNANCY TEST: CPT | Performed by: INTERNAL MEDICINE

## 2021-08-02 PROCEDURE — C9803 HOPD COVID-19 SPEC COLLECT: HCPCS | Performed by: INTERNAL MEDICINE

## 2021-08-02 PROCEDURE — U0003 INFECTIOUS AGENT DETECTION BY NUCLEIC ACID (DNA OR RNA); SEVERE ACUTE RESPIRATORY SYNDROME CORONAVIRUS 2 (SARS-COV-2) (CORONAVIRUS DISEASE [COVID-19]), AMPLIFIED PROBE TECHNIQUE, MAKING USE OF HIGH THROUGHPUT TECHNOLOGIES AS DESCRIBED BY CMS-2020-01-R: HCPCS | Performed by: INTERNAL MEDICINE

## 2021-08-02 PROCEDURE — 25010000002 PROPOFOL 10 MG/ML EMULSION: Performed by: ANESTHESIOLOGY

## 2021-08-02 PROCEDURE — 45380 COLONOSCOPY AND BIOPSY: CPT | Performed by: INTERNAL MEDICINE

## 2021-08-02 RX ORDER — SODIUM CHLORIDE 0.9 % (FLUSH) 0.9 %
10 SYRINGE (ML) INJECTION AS NEEDED
Status: DISCONTINUED | OUTPATIENT
Start: 2021-08-02 | End: 2021-08-02 | Stop reason: HOSPADM

## 2021-08-02 RX ORDER — PROPOFOL 10 MG/ML
VIAL (ML) INTRAVENOUS CONTINUOUS PRN
Status: DISCONTINUED | OUTPATIENT
Start: 2021-08-02 | End: 2021-08-02 | Stop reason: SURG

## 2021-08-02 RX ORDER — LIDOCAINE HYDROCHLORIDE 20 MG/ML
INJECTION, SOLUTION INFILTRATION; PERINEURAL AS NEEDED
Status: DISCONTINUED | OUTPATIENT
Start: 2021-08-02 | End: 2021-08-02 | Stop reason: SURG

## 2021-08-02 RX ORDER — PROPOFOL 10 MG/ML
VIAL (ML) INTRAVENOUS AS NEEDED
Status: DISCONTINUED | OUTPATIENT
Start: 2021-08-02 | End: 2021-08-02 | Stop reason: SURG

## 2021-08-02 RX ORDER — SODIUM CHLORIDE 0.9 % (FLUSH) 0.9 %
3 SYRINGE (ML) INJECTION EVERY 12 HOURS SCHEDULED
Status: DISCONTINUED | OUTPATIENT
Start: 2021-08-02 | End: 2021-08-02 | Stop reason: HOSPADM

## 2021-08-02 RX ORDER — SODIUM CHLORIDE, SODIUM LACTATE, POTASSIUM CHLORIDE, CALCIUM CHLORIDE 600; 310; 30; 20 MG/100ML; MG/100ML; MG/100ML; MG/100ML
30 INJECTION, SOLUTION INTRAVENOUS CONTINUOUS PRN
Status: DISCONTINUED | OUTPATIENT
Start: 2021-08-02 | End: 2021-08-02 | Stop reason: HOSPADM

## 2021-08-02 RX ORDER — ONDANSETRON 2 MG/ML
4 INJECTION INTRAMUSCULAR; INTRAVENOUS ONCE AS NEEDED
Status: DISCONTINUED | OUTPATIENT
Start: 2021-08-02 | End: 2021-08-02 | Stop reason: HOSPADM

## 2021-08-02 RX ORDER — AMOXICILLIN 500 MG/1
1000 CAPSULE ORAL 2 TIMES DAILY
COMMUNITY
End: 2021-09-14

## 2021-08-02 RX ADMIN — PROPOFOL 50 MG: 10 INJECTION, EMULSION INTRAVENOUS at 16:43

## 2021-08-02 RX ADMIN — Medication 200 MCG/KG/MIN: at 16:29

## 2021-08-02 RX ADMIN — SODIUM CHLORIDE, POTASSIUM CHLORIDE, SODIUM LACTATE AND CALCIUM CHLORIDE 30 ML/HR: 600; 310; 30; 20 INJECTION, SOLUTION INTRAVENOUS at 15:41

## 2021-08-02 RX ADMIN — LIDOCAINE HYDROCHLORIDE 50 MG: 20 INJECTION, SOLUTION INFILTRATION; PERINEURAL at 16:29

## 2021-08-02 RX ADMIN — PROPOFOL 100 MG: 10 INJECTION, EMULSION INTRAVENOUS at 16:28

## 2021-08-02 RX ADMIN — PROPOFOL 100 MG: 10 INJECTION, EMULSION INTRAVENOUS at 16:32

## 2021-08-02 NOTE — ANESTHESIA PREPROCEDURE EVALUATION
Anesthesia Evaluation     Patient summary reviewed and Nursing notes reviewed   NPO Solid Status: > 8 hours  NPO Liquid Status: > 2 hours           Airway   Mallampati: I  TM distance: >3 FB  Neck ROM: full  Dental      Comment: Risk of dental injury discussed with patient      Pulmonary     breath sounds clear to auscultation  (-) shortness of breath, not a smoker  Cardiovascular   Exercise tolerance: good (4-7 METS)    Rhythm: regular  Rate: normal    (-) angina, BEE      Neuro/Psych  (+) headaches, psychiatric history Anxiety and Depression,     GI/Hepatic/Renal/Endo    (+)   renal disease stones,     Musculoskeletal     Abdominal    Substance History      OB/GYN          Other                      Anesthesia Plan    ASA 2     MAC   (MAC anesthesia discussed with patient and/or patient representative. Risks (including but not limited to intra-op awareness), benefits, and alternatives were discussed. Understanding was voiced with an agreement to proceed with a MAC technique and General as a backup option.   )  intravenous induction     Anesthetic plan, all risks, benefits, and alternatives have been provided, discussed and informed consent has been obtained with: patient.

## 2021-08-02 NOTE — ANESTHESIA POSTPROCEDURE EVALUATION
"Patient: Cassy Causey    Procedure Summary     Date: 08/02/21 Room / Location: [facility] ENDOSCOPY 4 / [facility] ENDOSCOPY    Anesthesia Start: 1623 Anesthesia Stop: 1701    Procedures:       ESOPHAGOGASTRODUODENOSCOPY with cold bxs (N/A Esophagus)      COLONOSCOPY to cecum and ti with cold bxs (N/A ) Diagnosis:       Periumbilical abdominal pain      Intractable vomiting with nausea, unspecified vomiting type      Alternating constipation and diarrhea      (Periumbilical abdominal pain [R10.33])      (Intractable vomiting with nausea, unspecified vomiting type [R11.2])      (Alternating constipation and diarrhea [R19.8])    Surgeons: Caesar Sorensen MD Provider: Crystal Ortega MD    Anesthesia Type: MAC ASA Status: 2          Anesthesia Type: MAC    Vitals  Vitals Value Taken Time   /77 08/02/21 1720   Temp     Pulse 75 08/02/21 1720   Resp 16 08/02/21 1720   SpO2 97 % 08/02/21 1720           Post Anesthesia Care and Evaluation    Patient location during evaluation: PHASE II  Patient participation: complete - patient participated  Level of consciousness: awake  Pain management: adequate  Airway patency: patent  Anesthetic complications: No anesthetic complications    Cardiovascular status: acceptable  Respiratory status: acceptable  Hydration status: acceptable    Comments: /77 (BP Location: Left arm, Patient Position: Lying)   Pulse 75   Temp 36.6 °C (97.9 °F) (Oral)   Resp 16   Ht 170.2 cm (67\")   Wt 58.6 kg (129 lb 3.2 oz)   LMP 08/02/2019   SpO2 97%   BMI 20.24 kg/m²       "

## 2021-08-02 NOTE — H&P
Chief Complaint   Patient presents with   • Abdominal Pain   • Bloated         History of Present Illness:   20 y.o. female who c/o a many year h/o abdominal pain, in the epigastric, periumbilical pain and RLQ abdominal pain.  She has kidney stones. She is irregular and has constipated. She may have diarrhea also after having no BM x 5 days. NO rectal bleeding. She doesn't know what makes her abdomen hurt or what makes it better. HEr stomach pain may awaken her. No period x 2 yrs from [facility]. No association with menses that she knows of. She may vomit if she hasn't moved her bowels in several days. NO fevers, chills, night sweats. Weight stable. Nonsmoker. RAre ETOH. 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 Works as . Doesn't want Covid vaccine. 3 of her uncles were paralyzed from flu vaccine. She has burning on urination. H/o kidney stones.         She had a CT abd/pelvis in 2019 at [facility] that showed:  IMPRESSION:   1. Numerous small nonobstructing bilateral renal calculi have mildly increased size and number since 2016.   2. A new 4 mm right pelvic sidewall level calculus could well be within the nondilated distal right ureter. Clinical correlation is recommended.   3. No urinary bladder stones disease is evident.        No FH of crohn's or UC. No hematuria now.      Medical History        Past Medical History:   Diagnosis Date   • Anxiety     • Chlamydia     • IC (interstitial cystitis)     • Kidney stone     • Migraine              Surgical History         Past Surgical History:   Procedure Laterality Date   • KIDNEY STONE SURGERY Right 08/2019     Body rejected stent that was placed.               Current Outpatient Medications:   •  Norethin-Eth Estrad-Fe Biphas (Lo Loestrin Fe) 1 MG-10 MCG / 10 MCG tablet, Take 1 tablet by mouth Daily., Disp: 84 tablet, Rfl: 3  •  phenazopyridine (Pyridium) 200 MG tablet, Take 1 tablet by mouth 2 (two) times a day for 2 days., Disp: 4 tablet, Rfl: 0  •   amitriptyline (ELAVIL) 25 MG tablet, Take 2 tablets by mouth Every Night., Disp: 30 tablet, Rfl: 11     No Known Allergies           Family History   Problem Relation Age of Onset   • Brain cancer Father     • Hypertension Father     • Hypertension Paternal Grandfather     • Diabetes Maternal Grandfather     • Hypertension Maternal Grandfather     • Breast cancer Neg Hx     • Ovarian cancer Neg Hx     • Uterine cancer Neg Hx     • Colon cancer Neg Hx     • Deep vein thrombosis Neg Hx     • Pulmonary embolism Neg Hx           Social History   Social History            Socioeconomic History   • Marital status: Single       Spouse name: Not on file   • Number of children: Not on file   • Years of education: Not on file   • Highest education level: Not on file   Tobacco Use   • Smoking status: Never Smoker   • Smokeless tobacco: Never Used   Substance and Sexual Activity   • Alcohol use: No   • Drug use: No   • Sexual activity: Yes       Partners: Male       Birth control/protection: OCP            Review of Systems   Gastrointestinal: Positive for abdominal pain, constipation and diarrhea.      Pertinent positives and negatives documented in the HPI and all other systems reviewed and were found to be negative.      Vitals:     06/17/21 1044   BP: 110/70   Temp: 97.6 °F (36.4 °C)         Physical Exam  Vitals reviewed.   Constitutional:       General: She is not in acute distress.     Appearance: Normal appearance. She is well-developed. She is not diaphoretic.   HENT:      Head: Normocephalic and atraumatic. Hair is normal.      Right Ear: Hearing, tympanic membrane, ear canal and external ear normal. No decreased hearing noted. No drainage.      Left Ear: Hearing, tympanic membrane, ear canal and external ear normal. No decreased hearing noted.      Nose: Nose normal. No nasal deformity.      Mouth/Throat:      Mouth: Mucous membranes are moist.   Eyes:      General: Lids are normal.         Right eye: No discharge.          Left eye: No discharge.      Extraocular Movements: Extraocular movements intact.      Conjunctiva/sclera: Conjunctivae normal.      Pupils: Pupils are equal, round, and reactive to light.   Neck:      Thyroid: No thyromegaly.      Vascular: No JVD.      Trachea: No tracheal deviation.   Cardiovascular:      Rate and Rhythm: Normal rate and regular rhythm.      Pulses: Normal pulses.      Heart sounds: Normal heart sounds. No murmur heard.   No friction rub. No gallop.    Pulmonary:      Effort: Pulmonary effort is normal. No respiratory distress.      Breath sounds: Normal breath sounds. No wheezing or rales.   Chest:      Chest wall: No tenderness.   Abdominal:      General: Bowel sounds are normal. There is no distension.      Palpations: Abdomen is soft. There is no mass.      Tenderness: There is no abdominal tenderness. There is no guarding or rebound.      Hernia: No hernia is present.   Musculoskeletal:         General: No tenderness or deformity. Normal range of motion.      Cervical back: Normal range of motion and neck supple.   Lymphadenopathy:      Cervical: No cervical adenopathy.   Skin:     General: Skin is warm and dry.      Findings: No erythema or rash.   Neurological:      Mental Status: She is alert and oriented to person, place, and time.      Cranial Nerves: No cranial nerve deficit.      Motor: No abnormal muscle tone.      Coordination: Coordination normal.      Deep Tendon Reflexes: Reflexes are normal and symmetric. Reflexes normal.   Psychiatric:         Mood and Affect: Mood normal.         Behavior: Behavior normal.         Thought Content: Thought content normal.         Judgment: Judgment normal.            Diagnoses and all orders for this visit:     1. Periumbilical abdominal pain (Primary)  -     Amylase  -     CBC & Differential  -     Celiac Ab tTG DGP TIgA  -     Comprehensive Metabolic Panel  -     Lipase  -     TSH  -     Sedimentation Rate  -     C-reactive Protein  -      Case Request; Standing  -     Case Request     2. Intractable vomiting with nausea, unspecified vomiting type  -     Amylase  -     CBC & Differential  -     Celiac Ab tTG DGP TIgA  -     Comprehensive Metabolic Panel  -     Lipase  -     TSH  -     Sedimentation Rate  -     C-reactive Protein  -     Case Request; Standing  -     Case Request     3. Alternating constipation and diarrhea  -     Amylase  -     CBC & Differential  -     Celiac Ab tTG DGP TIgA  -     Comprehensive Metabolic Panel  -     Lipase  -     TSH  -     Sedimentation Rate  -     C-reactive Protein  -     Case Request; Standing  -     Case Request     Other orders  -     Follow Anesthesia Guidelines / Standing Orders; Future  -     Obtain Informed Consent; Future  -     Implement Anesthesia orders day of procedure.; Standing  -     Obtain informed consent; Standing  -     Verify bowel prep was successful; Standing  -     Give tap water enema if bowel prep was insufficient; Standing        Assessment:  1. Abdominal pain  2. Constipation alternating with diarrhea.   3. H/o kidney stones.  4. Intermittent vomiting.       Recommendations:  1. Labs:   2. EGD and colonoscopy. Do these this summer.      Return Schedule EGD and colonoscopy this summer..     8/2/21 - No change from the above H and P.   Caesar Sorensen MD

## 2021-08-04 ENCOUNTER — TELEPHONE (OUTPATIENT)
Dept: GASTROENTEROLOGY | Facility: CLINIC | Age: 21
End: 2021-08-04

## 2021-08-04 LAB
LAB AP CASE REPORT: NORMAL
PATH REPORT.FINAL DX SPEC: NORMAL
PATH REPORT.GROSS SPEC: NORMAL

## 2021-08-04 NOTE — TELEPHONE ENCOUNTER
----- Message from Francis Villalpando sent at 8/4/2021  2:04 PM EDT -----  Regarding: CALLBACK SYMPTOMS  Contact: [phone]  PT called in stating that she is experiencing an intense pain after her surgery, she is unsure if it is normal or not and would like to speak with nurses about it please. Thank you.

## 2021-08-04 NOTE — TELEPHONE ENCOUNTER
"Call to pt.  States since egd on 8/2, experiencing \"severe stomach pain every time she eats\".  Pain wakens her.  Must lie in fetal position.  Worse with breathing.  Has tried gas-x without relief.      Advise go to ER for immediate eval.  States will do so.     Update to DR Sorensen.   "

## 2021-08-05 ENCOUNTER — TELEPHONE (OUTPATIENT)
Dept: GASTROENTEROLOGY | Facility: CLINIC | Age: 21
End: 2021-08-05

## 2021-08-05 DIAGNOSIS — R10.33 PERIUMBILICAL ABDOMINAL PAIN: Primary | ICD-10-CM

## 2021-08-05 NOTE — TELEPHONE ENCOUNTER
Since we did the EGD and colonoscopy on 8/2/2021 she has complained of pain between the shoulder blades that may awaken her at 4 AM.  She has no fever chills no nausea or vomiting.  She has no abdominal pain.  She is not short of breath.  I recommend that she go to the emergency room to be evaluated.       I did go over the results of pathology from the EGD and colonoscopy.  My plan is to order a CT small bowel enterography on her at some point to rule out Crohn's disease given her abdominal pain nausea and vomiting.

## 2021-08-08 NOTE — TELEPHONE ENCOUNTER
Please check on her. See how she is feeling. She had pain between her shoulder blades after her recent EGD and colonoscopy. We had recommended that she go to the ER which I don't think that she did. I hope she is better? Thx.kjh

## 2021-08-09 NOTE — TELEPHONE ENCOUNTER
We could order an US of the GB at [facility] if she wants to evaluate her right sided abdominal pain? Thx [email or web address]

## 2021-08-09 NOTE — TELEPHONE ENCOUNTER
Called pt and advised of Dr Sorensen's note.  Verb understanding but states she is at [facility] in [place] and will see what they say.     Records received from [place] and scanned under media tab.   Update sent to Dr Sorensen.

## 2021-08-09 NOTE — TELEPHONE ENCOUNTER
Called pt and pt reports that she is still having the same abd and back pain.  She did go to the ER at the [facility] [place] and they did a ct scan and also suggested it may be her gb.  Pt is trying to find a MD to see her in [place].  Advised will send update to Dr Sorensen.   Verb understanding.     Called the [facility] at [phone] spoke with Becca and requested medical records of pt's ER visit last week be faxed to us at [phone].    Update sent to Dr Sorensen.

## 2021-08-11 ENCOUNTER — TELEPHONE (OUTPATIENT)
Dept: GASTROENTEROLOGY | Facility: CLINIC | Age: 21
End: 2021-08-11

## 2021-08-11 DIAGNOSIS — R19.8 ALTERNATING CONSTIPATION AND DIARRHEA: ICD-10-CM

## 2021-08-11 DIAGNOSIS — R11.2 INTRACTABLE VOMITING WITH NAUSEA, UNSPECIFIED VOMITING TYPE: ICD-10-CM

## 2021-08-11 DIAGNOSIS — R10.33 PERIUMBILICAL ABDOMINAL PAIN: Primary | ICD-10-CM

## 2021-08-11 NOTE — TELEPHONE ENCOUNTER
----- Message from Francis Melton sent at 8/11/2021  8:36 AM EDT -----  Regarding: ultrasound  Pt left vm stating ultrasound is getting sent over

## 2021-08-12 NOTE — TELEPHONE ENCOUNTER
If she is OK with this then let's schedule her for a Kinevac Hida scan to look at the function of the gallbladder. I would have her f/u with me in the office one week after that test. She is in school at [employer]. Thx.kjh

## 2021-08-13 ENCOUNTER — TELEPHONE (OUTPATIENT)
Dept: GASTROENTEROLOGY | Facility: CLINIC | Age: 21
End: 2021-08-13

## 2021-08-13 NOTE — TELEPHONE ENCOUNTER
Called pt to see if she was scheduled, she says no but will call to do so for a DOS after 8/23/21. [facility] to [facility]/npi but had to leave a message. These are needed to file the PA with her insurance.

## 2021-08-13 NOTE — TELEPHONE ENCOUNTER
Called pt and advised of Dr Sorensen's note.  Pt verb understanding and would like to proceed with hida scan.  Pt would like to get this done at the [facility] in [place].      Called the [facility] at [phone] and they are unable to do hida scan and they suggested she get this done at the [facility] at [place]. .     Called pt and advised of the above. Pt verb understanding and states she would like to have this done at [facility] [place].     Called [facility] at [phone] and spoke with Gregoria and was advised to fax order to them at [phone].  Order faxed and confirmation received.     Message sent to Dr Sorensen to cosign order.     Message sent to [employer] for precert.

## 2021-08-16 ENCOUNTER — TELEPHONE (OUTPATIENT)
Dept: GASTROENTEROLOGY | Facility: CLINIC | Age: 21
End: 2021-08-16

## 2021-08-16 NOTE — TELEPHONE ENCOUNTER
Pt called to state that she is scheduled for her HIDA on 8/26 with [facility] and that she will call her insurance to make sure the faciliity is INN.

## 2021-08-16 NOTE — TELEPHONE ENCOUNTER
Called pt Cassy to advise that she could call the facility to make her appt but she needed to call her insurance to make sure they were INN for her  No PA required for the HIDA SCAN proc.

## 2021-08-25 ENCOUNTER — TELEPHONE (OUTPATIENT)
Dept: GASTROENTEROLOGY | Facility: CLINIC | Age: 21
End: 2021-08-25

## 2021-08-25 NOTE — TELEPHONE ENCOUNTER
Pt called and reports that the [facility] in [place] did not get her hida order.  Called the [facility] at [place] at [phone] and spoke with Kyara in radiology scheduling and she advised to fax order to them at [phone].  Order faxed and confirmation received. Order scanned under media tab.

## 2021-08-25 NOTE — TELEPHONE ENCOUNTER
----- Message from Francis Sandoval sent at 8/25/2021  1:15 PM EDT -----  Regarding: Pt call back  Contact: [phone]  Pt calling back in regards to notes from call on 08/17/2021.  Pt needs to see if order sent over.

## 2021-09-08 ENCOUNTER — TELEPHONE (OUTPATIENT)
Dept: GASTROENTEROLOGY | Facility: CLINIC | Age: 21
End: 2021-09-08

## 2021-09-08 NOTE — TELEPHONE ENCOUNTER
See encounter notes of 8/25.     Call to pt. States SEXTON completed 8/26 @ [place].    Call to [facility] @ [phone] and spoke with Becca.  Request DARSHAN results be faxed to .

## 2021-09-08 NOTE — TELEPHONE ENCOUNTER
----- Message from Francis Sandoval sent at 9/8/2021 11:51 AM EDT -----  Regarding: Hida Scan Results  Contact: [phone]  Pt calling regarding Hida Scan Results.  Please contact regarding results.  Thank You

## 2021-09-09 ENCOUNTER — TELEPHONE (OUTPATIENT)
Dept: INTERNAL MEDICINE | Facility: HOSPITAL | Age: 21
End: 2021-09-09

## 2021-09-09 DIAGNOSIS — R10.10 PAIN OF UPPER ABDOMEN: Primary | ICD-10-CM

## 2021-09-14 ENCOUNTER — OFFICE VISIT (OUTPATIENT)
Dept: SURGERY | Facility: CLINIC | Age: 21
End: 2021-09-14

## 2021-09-14 VITALS — BODY MASS INDEX: 21.02 KG/M2 | WEIGHT: 130.8 LBS | HEIGHT: 66 IN

## 2021-09-14 DIAGNOSIS — K82.8 BILIARY DYSKINESIA: Primary | ICD-10-CM

## 2021-09-14 PROCEDURE — 99243 OFF/OP CNSLTJ NEW/EST LOW 30: CPT | Performed by: SURGERY

## 2021-09-14 RX ORDER — CEFAZOLIN SODIUM 2 G/100ML
2 INJECTION, SOLUTION INTRAVENOUS ONCE
Status: CANCELLED | OUTPATIENT
Start: 2021-09-24 | End: 2021-09-14

## 2021-09-14 NOTE — PROGRESS NOTES
Subjective   Cassy Causey is a 20 y.o. female who presents to the office in surgical consultation from Caesar Sorensen MD for persistent epigastric abdominal pain.    History of Present Illness     The patient has a 5-year history of abdominal pain that is in the epigastrium and feels like pressure.  It was initially mild but somewhat infrequent but it has become much more common.  Over the past several months it is constant.  It is worse with eating and she frequently has some nausea associated with it.  She has decreased her food and has lost 7 pounds.  She also has a decrease in her energy level.      She also has persistent right pelvic pain.  This is unrelated to her epigastric abdominal pain.  She has a previous history of ovarian cysts and is followed by a gynecologist.    She had an EGD and colonoscopy on 8/2/2021 that was normal.  She had a right upper quadrant ultrasound on 8/10/2021 that was normal.  She had a HIDA scan on 8/13/2021 with stimulation of the gallbladder provided by the ingestion of strawberry milk with a gallbladder ejection fraction of 0%.    Review of Systems   Constitutional: Negative for fatigue and fever.   HENT: Negative for trouble swallowing and voice change.    Respiratory: Negative for chest tightness and shortness of breath.    Cardiovascular: Negative for chest pain and palpitations.   Gastrointestinal: Positive for abdominal pain and nausea. Negative for blood in stool, constipation, diarrhea and vomiting.   Psychiatric/Behavioral: Negative for agitation and confusion.     Past Medical History:   Diagnosis Date   • Alternating constipation and diarrhea    • Anemia    • Anxiety    • Chlamydia    • IC (interstitial cystitis)    • Intractable vomiting    • Kidney stone    • Migraine    • Periumbilical abdominal pain      Past Surgical History:   Procedure Laterality Date   • COLONOSCOPY N/A 8/2/2021    Procedure: COLONOSCOPY to cecum and ti with cold bxs;  Surgeon: Caesar Sorensen,  MD;  Location: [facility] ENDOSCOPY;  Service: Gastroenterology;  Laterality: N/A;  PRE: Abd Pain, Alternating constipation/diarrhea  POST: Normal   • DENTAL PROCEDURE     • ENDOSCOPY N/A 8/2/2021    Procedure: ESOPHAGOGASTRODUODENOSCOPY with cold bxs;  Surgeon: Caesar Sorensen MD;  Location: [facility] ENDOSCOPY;  Service: Gastroenterology;  Laterality: N/A;  PRE: Abd Pain, N&V  POST: Gastritis   • KIDNEY STONE SURGERY Right 08/2019    Body rejected stent that was placed.     Family History   Problem Relation Age of Onset   • Brain cancer Father    • Hypertension Father    • Hypertension Paternal Grandfather    • Diabetes Maternal Grandfather    • Hypertension Maternal Grandfather    • Breast cancer Neg Hx    • Ovarian cancer Neg Hx    • Uterine cancer Neg Hx    • Colon cancer Neg Hx    • Deep vein thrombosis Neg Hx    • Pulmonary embolism Neg Hx    • Malig Hyperthermia Neg Hx      Social History     Socioeconomic History   • Marital status: Single     Spouse name: Not on file   • Number of children: Not on file   • Years of education: Not on file   • Highest education level: Not on file   Tobacco Use   • Smoking status: Never Smoker   • Smokeless tobacco: Never Used   Vaping Use   • Vaping Use: Never used   Substance and Sexual Activity   • Alcohol use: No   • Drug use: No   • Sexual activity: Yes     Partners: Male     Birth control/protection: OCP       Objective   Physical Exam  Constitutional:       Appearance: Normal appearance. She is well-developed. She is not toxic-appearing.   HENT:      Head: Normocephalic and atraumatic.   Eyes:      General: No scleral icterus.  Pulmonary:      Effort: Pulmonary effort is normal. No respiratory distress.   Abdominal:      General: Abdomen is flat. There is no distension.      Palpations: Abdomen is soft.      Tenderness: There is abdominal tenderness in the right upper quadrant and right lower quadrant. There is no guarding or rebound.      Comments: There is mild mild right  upper quadrant tenderness and mild right pelvic tenderness.   Skin:     General: Skin is warm and dry.   Neurological:      Mental Status: She is alert and oriented to person, place, and time.   Psychiatric:         Behavior: Behavior normal.         Thought Content: Thought content normal.         Judgment: Judgment normal.         Assessment/Plan       The encounter diagnosis was Biliary dyskinesia.    The patient has persistent pressure type symptoms in her epigastrium that is worse after eating and frequently associated with nausea.  It has led to a decreased appetite and weight loss.  Her evaluation has been normal except for a HIDA scan that showed a 0% gallbladder ejection fraction.  She has been diagnosed with biliary dyskinesia and will be scheduled for a laparoscopic cholecystectomy with intraoperative cholangiogram.  The patient understands the indications, alternatives, risks, and benefits of the procedure and wishes to proceed.    The patient has persistent right pelvic pain that is likely related to ovarian cyst disease.  She was advised to continue follow-up with her gynecologist.

## 2021-09-14 NOTE — H&P (VIEW-ONLY)
Subjective   Cassy Causey is a 20 y.o. female who presents to the office in surgical consultation from Caesar Sorensen MD for persistent epigastric abdominal pain.    History of Present Illness     The patient has a 5-year history of abdominal pain that is in the epigastrium and feels like pressure.  It was initially mild but somewhat infrequent but it has become much more common.  Over the past several months it is constant.  It is worse with eating and she frequently has some nausea associated with it.  She has decreased her food and has lost 7 pounds.  She also has a decrease in her energy level.      She also has persistent right pelvic pain.  This is unrelated to her epigastric abdominal pain.  She has a previous history of ovarian cysts and is followed by a gynecologist.    She had an EGD and colonoscopy on 8/2/2021 that was normal.  She had a right upper quadrant ultrasound on 8/10/2021 that was normal.  She had a HIDA scan on 8/13/2021 with stimulation of the gallbladder provided by the ingestion of strawberry milk with a gallbladder ejection fraction of 0%.    Review of Systems   Constitutional: Negative for fatigue and fever.   HENT: Negative for trouble swallowing and voice change.    Respiratory: Negative for chest tightness and shortness of breath.    Cardiovascular: Negative for chest pain and palpitations.   Gastrointestinal: Positive for abdominal pain and nausea. Negative for blood in stool, constipation, diarrhea and vomiting.   Psychiatric/Behavioral: Negative for agitation and confusion.     Past Medical History:   Diagnosis Date   • Alternating constipation and diarrhea    • Anemia    • Anxiety    • Chlamydia    • IC (interstitial cystitis)    • Intractable vomiting    • Kidney stone    • Migraine    • Periumbilical abdominal pain      Past Surgical History:   Procedure Laterality Date   • COLONOSCOPY N/A 8/2/2021    Procedure: COLONOSCOPY to cecum and ti with cold bxs;  Surgeon: Caesar Sorensen,  MD;  Location: [facility] ENDOSCOPY;  Service: Gastroenterology;  Laterality: N/A;  PRE: Abd Pain, Alternating constipation/diarrhea  POST: Normal   • DENTAL PROCEDURE     • ENDOSCOPY N/A 8/2/2021    Procedure: ESOPHAGOGASTRODUODENOSCOPY with cold bxs;  Surgeon: Caesar Sorensen MD;  Location: [facility] ENDOSCOPY;  Service: Gastroenterology;  Laterality: N/A;  PRE: Abd Pain, N&V  POST: Gastritis   • KIDNEY STONE SURGERY Right 08/2019    Body rejected stent that was placed.     Family History   Problem Relation Age of Onset   • Brain cancer Father    • Hypertension Father    • Hypertension Paternal Grandfather    • Diabetes Maternal Grandfather    • Hypertension Maternal Grandfather    • Breast cancer Neg Hx    • Ovarian cancer Neg Hx    • Uterine cancer Neg Hx    • Colon cancer Neg Hx    • Deep vein thrombosis Neg Hx    • Pulmonary embolism Neg Hx    • Malig Hyperthermia Neg Hx      Social History     Socioeconomic History   • Marital status: Single     Spouse name: Not on file   • Number of children: Not on file   • Years of education: Not on file   • Highest education level: Not on file   Tobacco Use   • Smoking status: Never Smoker   • Smokeless tobacco: Never Used   Vaping Use   • Vaping Use: Never used   Substance and Sexual Activity   • Alcohol use: No   • Drug use: No   • Sexual activity: Yes     Partners: Male     Birth control/protection: OCP       Objective   Physical Exam  Constitutional:       Appearance: Normal appearance. She is well-developed. She is not toxic-appearing.   HENT:      Head: Normocephalic and atraumatic.   Eyes:      General: No scleral icterus.  Pulmonary:      Effort: Pulmonary effort is normal. No respiratory distress.   Abdominal:      General: Abdomen is flat. There is no distension.      Palpations: Abdomen is soft.      Tenderness: There is abdominal tenderness in the right upper quadrant and right lower quadrant. There is no guarding or rebound.      Comments: There is mild mild right  upper quadrant tenderness and mild right pelvic tenderness.   Skin:     General: Skin is warm and dry.   Neurological:      Mental Status: She is alert and oriented to person, place, and time.   Psychiatric:         Behavior: Behavior normal.         Thought Content: Thought content normal.         Judgment: Judgment normal.         Assessment/Plan       The encounter diagnosis was Biliary dyskinesia.    The patient has persistent pressure type symptoms in her epigastrium that is worse after eating and frequently associated with nausea.  It has led to a decreased appetite and weight loss.  Her evaluation has been normal except for a HIDA scan that showed a 0% gallbladder ejection fraction.  She has been diagnosed with biliary dyskinesia and will be scheduled for a laparoscopic cholecystectomy with intraoperative cholangiogram.  The patient understands the indications, alternatives, risks, and benefits of the procedure and wishes to proceed.    The patient has persistent right pelvic pain that is likely related to ovarian cyst disease.  She was advised to continue follow-up with her gynecologist.

## 2021-09-24 ENCOUNTER — HOSPITAL ENCOUNTER (OUTPATIENT)
Facility: HOSPITAL | Age: 21
Setting detail: HOSPITAL OUTPATIENT SURGERY
Discharge: HOME | End: 2021-09-24
Attending: SURGERY | Admitting: SURGERY

## 2021-09-24 ENCOUNTER — ANESTHESIA (OUTPATIENT)
Dept: PERIOP | Facility: HOSPITAL | Age: 21
End: 2021-09-24

## 2021-09-24 ENCOUNTER — TRANSCRIBE ORDERS (OUTPATIENT)
Dept: ADMINISTRATIVE | Facility: HOSPITAL | Age: 21
End: 2021-09-24

## 2021-09-24 ENCOUNTER — APPOINTMENT (OUTPATIENT)
Dept: GENERAL RADIOLOGY | Facility: HOSPITAL | Age: 21
End: 2021-09-24

## 2021-09-24 ENCOUNTER — LAB (OUTPATIENT)
Dept: LAB | Facility: HOSPITAL | Age: 21
End: 2021-09-24

## 2021-09-24 ENCOUNTER — ANESTHESIA EVENT (OUTPATIENT)
Dept: PERIOP | Facility: HOSPITAL | Age: 21
End: 2021-09-24

## 2021-09-24 VITALS
RESPIRATION RATE: 18 BRPM | SYSTOLIC BLOOD PRESSURE: 112 MMHG | DIASTOLIC BLOOD PRESSURE: 68 MMHG | HEART RATE: 88 BPM | OXYGEN SATURATION: 97 % | TEMPERATURE: 97.5 F

## 2021-09-24 DIAGNOSIS — K82.8 BILIARY DYSKINESIA: ICD-10-CM

## 2021-09-24 DIAGNOSIS — Z01.818 PREOP EXAMINATION: Primary | ICD-10-CM

## 2021-09-24 LAB
B-HCG UR QL: NEGATIVE
INTERNAL NEGATIVE CONTROL: NORMAL
INTERNAL POSITIVE CONTROL: NORMAL
Lab: NORMAL
SARS-COV-2 RNA RESP QL NAA+PROBE: NOT DETECTED

## 2021-09-24 PROCEDURE — 25010000002 KETOROLAC TROMETHAMINE PER 15 MG: Performed by: NURSE ANESTHETIST, CERTIFIED REGISTERED

## 2021-09-24 PROCEDURE — C1889 IMPLANT/INSERT DEVICE, NOC: HCPCS | Performed by: SURGERY

## 2021-09-24 PROCEDURE — 0 IOTHALAMATE 60 % SOLUTION: Performed by: SURGERY

## 2021-09-24 PROCEDURE — 25010000002 MIDAZOLAM PER 1 MG: Performed by: ANESTHESIOLOGY

## 2021-09-24 PROCEDURE — 25010000002 HYDROMORPHONE PER 4 MG: Performed by: NURSE ANESTHETIST, CERTIFIED REGISTERED

## 2021-09-24 PROCEDURE — C9803 HOPD COVID-19 SPEC COLLECT: HCPCS | Performed by: INTERNAL MEDICINE

## 2021-09-24 PROCEDURE — 25010000002 DEXAMETHASONE PER 1 MG: Performed by: NURSE ANESTHETIST, CERTIFIED REGISTERED

## 2021-09-24 PROCEDURE — 25010000002 FENTANYL CITRATE (PF) 50 MCG/ML SOLUTION: Performed by: NURSE ANESTHETIST, CERTIFIED REGISTERED

## 2021-09-24 PROCEDURE — U0003 INFECTIOUS AGENT DETECTION BY NUCLEIC ACID (DNA OR RNA); SEVERE ACUTE RESPIRATORY SYNDROME CORONAVIRUS 2 (SARS-COV-2) (CORONAVIRUS DISEASE [COVID-19]), AMPLIFIED PROBE TECHNIQUE, MAKING USE OF HIGH THROUGHPUT TECHNOLOGIES AS DESCRIBED BY CMS-2020-01-R: HCPCS | Performed by: INTERNAL MEDICINE

## 2021-09-24 PROCEDURE — 88304 TISSUE EXAM BY PATHOLOGIST: CPT | Performed by: SURGERY

## 2021-09-24 PROCEDURE — 25010000002 NEOSTIGMINE 5 MG/10ML SOLUTION: Performed by: NURSE ANESTHETIST, CERTIFIED REGISTERED

## 2021-09-24 PROCEDURE — 74300 X-RAY BILE DUCTS/PANCREAS: CPT

## 2021-09-24 PROCEDURE — 47563 LAPARO CHOLECYSTECTOMY/GRAPH: CPT | Performed by: SPECIALIST/TECHNOLOGIST, OTHER

## 2021-09-24 PROCEDURE — 81025 URINE PREGNANCY TEST: CPT | Performed by: ANESTHESIOLOGY

## 2021-09-24 PROCEDURE — 25010000002 ONDANSETRON PER 1 MG: Performed by: NURSE ANESTHETIST, CERTIFIED REGISTERED

## 2021-09-24 PROCEDURE — 25010000002 PROPOFOL 10 MG/ML EMULSION: Performed by: NURSE ANESTHETIST, CERTIFIED REGISTERED

## 2021-09-24 PROCEDURE — 47563 LAPARO CHOLECYSTECTOMY/GRAPH: CPT | Performed by: SURGERY

## 2021-09-24 PROCEDURE — 25010000003 CEFAZOLIN IN DEXTROSE 2-4 GM/100ML-% SOLUTION: Performed by: SURGERY

## 2021-09-24 DEVICE — HORIZON TI ML 6 CLIPS/CART
Type: IMPLANTABLE DEVICE | Site: ABDOMEN | Status: FUNCTIONAL
Brand: WECK

## 2021-09-24 RX ORDER — KETOROLAC TROMETHAMINE 30 MG/ML
INJECTION, SOLUTION INTRAMUSCULAR; INTRAVENOUS AS NEEDED
Status: DISCONTINUED | OUTPATIENT
Start: 2021-09-24 | End: 2021-09-24 | Stop reason: SURG

## 2021-09-24 RX ORDER — SODIUM CHLORIDE 0.9 % (FLUSH) 0.9 %
3 SYRINGE (ML) INJECTION EVERY 12 HOURS SCHEDULED
Status: DISCONTINUED | OUTPATIENT
Start: 2021-09-24 | End: 2021-09-24 | Stop reason: HOSPADM

## 2021-09-24 RX ORDER — DEXAMETHASONE SODIUM PHOSPHATE 10 MG/ML
INJECTION INTRAMUSCULAR; INTRAVENOUS AS NEEDED
Status: DISCONTINUED | OUTPATIENT
Start: 2021-09-24 | End: 2021-09-24 | Stop reason: SURG

## 2021-09-24 RX ORDER — FENTANYL CITRATE 50 UG/ML
50 INJECTION, SOLUTION INTRAMUSCULAR; INTRAVENOUS
Status: DISCONTINUED | OUTPATIENT
Start: 2021-09-24 | End: 2021-09-24 | Stop reason: HOSPADM

## 2021-09-24 RX ORDER — SODIUM CHLORIDE 9 MG/ML
INJECTION, SOLUTION INTRAVENOUS AS NEEDED
Status: DISCONTINUED | OUTPATIENT
Start: 2021-09-24 | End: 2021-09-24 | Stop reason: HOSPADM

## 2021-09-24 RX ORDER — MIDAZOLAM HYDROCHLORIDE 1 MG/ML
1 INJECTION INTRAMUSCULAR; INTRAVENOUS
Status: DISCONTINUED | OUTPATIENT
Start: 2021-09-24 | End: 2021-09-24 | Stop reason: HOSPADM

## 2021-09-24 RX ORDER — HYDRALAZINE HYDROCHLORIDE 20 MG/ML
5 INJECTION INTRAMUSCULAR; INTRAVENOUS
Status: DISCONTINUED | OUTPATIENT
Start: 2021-09-24 | End: 2021-09-24 | Stop reason: HOSPADM

## 2021-09-24 RX ORDER — NEOSTIGMINE METHYLSULFATE 0.5 MG/ML
INJECTION, SOLUTION INTRAVENOUS AS NEEDED
Status: DISCONTINUED | OUTPATIENT
Start: 2021-09-24 | End: 2021-09-24 | Stop reason: SURG

## 2021-09-24 RX ORDER — GLYCOPYRROLATE 0.2 MG/ML
INJECTION INTRAMUSCULAR; INTRAVENOUS AS NEEDED
Status: DISCONTINUED | OUTPATIENT
Start: 2021-09-24 | End: 2021-09-24 | Stop reason: SURG

## 2021-09-24 RX ORDER — FLUMAZENIL 0.1 MG/ML
0.2 INJECTION INTRAVENOUS AS NEEDED
Status: DISCONTINUED | OUTPATIENT
Start: 2021-09-24 | End: 2021-09-24 | Stop reason: HOSPADM

## 2021-09-24 RX ORDER — IBUPROFEN 600 MG/1
600 TABLET ORAL ONCE AS NEEDED
Status: DISCONTINUED | OUTPATIENT
Start: 2021-09-24 | End: 2021-09-24 | Stop reason: HOSPADM

## 2021-09-24 RX ORDER — HYDROMORPHONE HCL 110MG/55ML
PATIENT CONTROLLED ANALGESIA SYRINGE INTRAVENOUS AS NEEDED
Status: DISCONTINUED | OUTPATIENT
Start: 2021-09-24 | End: 2021-09-24 | Stop reason: SURG

## 2021-09-24 RX ORDER — NALOXONE HCL 0.4 MG/ML
0.2 VIAL (ML) INJECTION AS NEEDED
Status: DISCONTINUED | OUTPATIENT
Start: 2021-09-24 | End: 2021-09-24 | Stop reason: HOSPADM

## 2021-09-24 RX ORDER — MAGNESIUM HYDROXIDE 1200 MG/15ML
LIQUID ORAL AS NEEDED
Status: DISCONTINUED | OUTPATIENT
Start: 2021-09-24 | End: 2021-09-24 | Stop reason: HOSPADM

## 2021-09-24 RX ORDER — EPHEDRINE SULFATE 50 MG/ML
5 INJECTION, SOLUTION INTRAVENOUS ONCE AS NEEDED
Status: DISCONTINUED | OUTPATIENT
Start: 2021-09-24 | End: 2021-09-24 | Stop reason: HOSPADM

## 2021-09-24 RX ORDER — PROMETHAZINE HYDROCHLORIDE 25 MG/1
25 TABLET ORAL ONCE AS NEEDED
Status: DISCONTINUED | OUTPATIENT
Start: 2021-09-24 | End: 2021-09-24 | Stop reason: HOSPADM

## 2021-09-24 RX ORDER — DIPHENHYDRAMINE HCL 25 MG
25 CAPSULE ORAL
Status: DISCONTINUED | OUTPATIENT
Start: 2021-09-24 | End: 2021-09-24 | Stop reason: HOSPADM

## 2021-09-24 RX ORDER — DIPHENHYDRAMINE HYDROCHLORIDE 50 MG/ML
12.5 INJECTION INTRAMUSCULAR; INTRAVENOUS
Status: DISCONTINUED | OUTPATIENT
Start: 2021-09-24 | End: 2021-09-24 | Stop reason: HOSPADM

## 2021-09-24 RX ORDER — CEFAZOLIN SODIUM 2 G/100ML
2 INJECTION, SOLUTION INTRAVENOUS ONCE
Status: COMPLETED | OUTPATIENT
Start: 2021-09-24 | End: 2021-09-24

## 2021-09-24 RX ORDER — OXYCODONE AND ACETAMINOPHEN 10; 325 MG/1; MG/1
1 TABLET ORAL EVERY 4 HOURS PRN
Status: DISCONTINUED | OUTPATIENT
Start: 2021-09-24 | End: 2021-09-24 | Stop reason: HOSPADM

## 2021-09-24 RX ORDER — SCOLOPAMINE TRANSDERMAL SYSTEM 1 MG/1
1 PATCH, EXTENDED RELEASE TRANSDERMAL ONCE
Status: DISCONTINUED | OUTPATIENT
Start: 2021-09-24 | End: 2021-09-24 | Stop reason: HOSPADM

## 2021-09-24 RX ORDER — ONDANSETRON 2 MG/ML
INJECTION INTRAMUSCULAR; INTRAVENOUS AS NEEDED
Status: DISCONTINUED | OUTPATIENT
Start: 2021-09-24 | End: 2021-09-24 | Stop reason: SURG

## 2021-09-24 RX ORDER — HYDROMORPHONE HYDROCHLORIDE 1 MG/ML
0.5 INJECTION, SOLUTION INTRAMUSCULAR; INTRAVENOUS; SUBCUTANEOUS
Status: DISCONTINUED | OUTPATIENT
Start: 2021-09-24 | End: 2021-09-24 | Stop reason: HOSPADM

## 2021-09-24 RX ORDER — ROCURONIUM BROMIDE 10 MG/ML
INJECTION, SOLUTION INTRAVENOUS AS NEEDED
Status: DISCONTINUED | OUTPATIENT
Start: 2021-09-24 | End: 2021-09-24 | Stop reason: SURG

## 2021-09-24 RX ORDER — FAMOTIDINE 10 MG/ML
20 INJECTION, SOLUTION INTRAVENOUS ONCE
Status: COMPLETED | OUTPATIENT
Start: 2021-09-24 | End: 2021-09-24

## 2021-09-24 RX ORDER — OXYCODONE HYDROCHLORIDE AND ACETAMINOPHEN 5; 325 MG/1; MG/1
1 TABLET ORAL ONCE AS NEEDED
Status: COMPLETED | OUTPATIENT
Start: 2021-09-24 | End: 2021-09-24

## 2021-09-24 RX ORDER — SODIUM CHLORIDE, SODIUM LACTATE, POTASSIUM CHLORIDE, CALCIUM CHLORIDE 600; 310; 30; 20 MG/100ML; MG/100ML; MG/100ML; MG/100ML
9 INJECTION, SOLUTION INTRAVENOUS CONTINUOUS
Status: DISCONTINUED | OUTPATIENT
Start: 2021-09-24 | End: 2021-09-24 | Stop reason: HOSPADM

## 2021-09-24 RX ORDER — LIDOCAINE HYDROCHLORIDE 10 MG/ML
0.5 INJECTION, SOLUTION EPIDURAL; INFILTRATION; INTRACAUDAL; PERINEURAL ONCE AS NEEDED
Status: DISCONTINUED | OUTPATIENT
Start: 2021-09-24 | End: 2021-09-24 | Stop reason: HOSPADM

## 2021-09-24 RX ORDER — BUPIVACAINE HYDROCHLORIDE AND EPINEPHRINE 5; 5 MG/ML; UG/ML
INJECTION, SOLUTION EPIDURAL; INTRACAUDAL; PERINEURAL AS NEEDED
Status: DISCONTINUED | OUTPATIENT
Start: 2021-09-24 | End: 2021-09-24 | Stop reason: HOSPADM

## 2021-09-24 RX ORDER — ONDANSETRON 2 MG/ML
4 INJECTION INTRAMUSCULAR; INTRAVENOUS ONCE AS NEEDED
Status: DISCONTINUED | OUTPATIENT
Start: 2021-09-24 | End: 2021-09-24 | Stop reason: HOSPADM

## 2021-09-24 RX ORDER — OXYCODONE HYDROCHLORIDE AND ACETAMINOPHEN 5; 325 MG/1; MG/1
TABLET ORAL
Qty: 20 TABLET | Refills: 0 | Status: SHIPPED | OUTPATIENT
Start: 2021-09-24 | End: 2021-10-12

## 2021-09-24 RX ORDER — HYDROCODONE BITARTRATE AND ACETAMINOPHEN 7.5; 325 MG/1; MG/1
1 TABLET ORAL ONCE AS NEEDED
Status: DISCONTINUED | OUTPATIENT
Start: 2021-09-24 | End: 2021-09-24 | Stop reason: HOSPADM

## 2021-09-24 RX ORDER — LABETALOL HYDROCHLORIDE 5 MG/ML
5 INJECTION, SOLUTION INTRAVENOUS
Status: DISCONTINUED | OUTPATIENT
Start: 2021-09-24 | End: 2021-09-24 | Stop reason: HOSPADM

## 2021-09-24 RX ORDER — FENTANYL CITRATE 50 UG/ML
INJECTION, SOLUTION INTRAMUSCULAR; INTRAVENOUS AS NEEDED
Status: DISCONTINUED | OUTPATIENT
Start: 2021-09-24 | End: 2021-09-24 | Stop reason: SURG

## 2021-09-24 RX ORDER — LIDOCAINE HYDROCHLORIDE 20 MG/ML
INJECTION, SOLUTION INFILTRATION; PERINEURAL AS NEEDED
Status: DISCONTINUED | OUTPATIENT
Start: 2021-09-24 | End: 2021-09-24 | Stop reason: SURG

## 2021-09-24 RX ORDER — PROPOFOL 10 MG/ML
VIAL (ML) INTRAVENOUS AS NEEDED
Status: DISCONTINUED | OUTPATIENT
Start: 2021-09-24 | End: 2021-09-24 | Stop reason: SURG

## 2021-09-24 RX ORDER — PROMETHAZINE HYDROCHLORIDE 25 MG/1
25 SUPPOSITORY RECTAL ONCE AS NEEDED
Status: DISCONTINUED | OUTPATIENT
Start: 2021-09-24 | End: 2021-09-24 | Stop reason: HOSPADM

## 2021-09-24 RX ORDER — SODIUM CHLORIDE 0.9 % (FLUSH) 0.9 %
3-10 SYRINGE (ML) INJECTION AS NEEDED
Status: DISCONTINUED | OUTPATIENT
Start: 2021-09-24 | End: 2021-09-24 | Stop reason: HOSPADM

## 2021-09-24 RX ORDER — ONDANSETRON 4 MG/1
4 TABLET, FILM COATED ORAL EVERY 6 HOURS PRN
Qty: 10 TABLET | Refills: 0 | Status: SHIPPED | OUTPATIENT
Start: 2021-09-24 | End: 2021-10-12

## 2021-09-24 RX ADMIN — DEXAMETHASONE SODIUM PHOSPHATE 8 MG: 10 INJECTION INTRAMUSCULAR; INTRAVENOUS at 11:22

## 2021-09-24 RX ADMIN — SCOPALAMINE 1 PATCH: 1 PATCH, EXTENDED RELEASE TRANSDERMAL at 09:35

## 2021-09-24 RX ADMIN — FENTANYL CITRATE 100 MCG: 50 INJECTION INTRAMUSCULAR; INTRAVENOUS at 11:11

## 2021-09-24 RX ADMIN — LIDOCAINE HYDROCHLORIDE 100 MG: 20 INJECTION, SOLUTION INFILTRATION; PERINEURAL at 11:17

## 2021-09-24 RX ADMIN — OXYCODONE AND ACETAMINOPHEN 1 TABLET: 5; 325 TABLET ORAL at 13:06

## 2021-09-24 RX ADMIN — MIDAZOLAM 1 MG: 1 INJECTION INTRAMUSCULAR; INTRAVENOUS at 09:36

## 2021-09-24 RX ADMIN — HYDROMORPHONE HYDROCHLORIDE 0.5 MG: 1 INJECTION, SOLUTION INTRAMUSCULAR; INTRAVENOUS; SUBCUTANEOUS at 13:06

## 2021-09-24 RX ADMIN — FAMOTIDINE 20 MG: 10 INJECTION INTRAVENOUS at 09:36

## 2021-09-24 RX ADMIN — KETOROLAC TROMETHAMINE 30 MG: 30 INJECTION, SOLUTION INTRAMUSCULAR at 12:08

## 2021-09-24 RX ADMIN — HYDROMORPHONE HYDROCHLORIDE 0.5 MG: 2 INJECTION, SOLUTION INTRAMUSCULAR; INTRAVENOUS; SUBCUTANEOUS at 11:38

## 2021-09-24 RX ADMIN — CEFAZOLIN SODIUM 2 G: 2 INJECTION, SOLUTION INTRAVENOUS at 11:11

## 2021-09-24 RX ADMIN — ROCURONIUM BROMIDE 40 MG: 50 INJECTION INTRAVENOUS at 11:17

## 2021-09-24 RX ADMIN — PROPOFOL 150 MG: 10 INJECTION, EMULSION INTRAVENOUS at 11:17

## 2021-09-24 RX ADMIN — SODIUM CHLORIDE, POTASSIUM CHLORIDE, SODIUM LACTATE AND CALCIUM CHLORIDE 9 ML/HR: 600; 310; 30; 20 INJECTION, SOLUTION INTRAVENOUS at 09:29

## 2021-09-24 RX ADMIN — GLYCOPYRROLATE 0.3 MG: 0.2 INJECTION INTRAMUSCULAR; INTRAVENOUS at 12:08

## 2021-09-24 RX ADMIN — NEOSTIGMINE METHYLSULFATE 4 MG: 0.5 INJECTION INTRAVENOUS at 12:08

## 2021-09-24 RX ADMIN — GLYCOPYRROLATE 0.2 MG: 0.2 INJECTION INTRAMUSCULAR; INTRAVENOUS at 12:10

## 2021-09-24 RX ADMIN — ONDANSETRON 4 MG: 2 INJECTION INTRAMUSCULAR; INTRAVENOUS at 12:08

## 2021-09-24 RX ADMIN — HYDROMORPHONE HYDROCHLORIDE 0.5 MG: 1 INJECTION, SOLUTION INTRAMUSCULAR; INTRAVENOUS; SUBCUTANEOUS at 13:19

## 2021-09-24 NOTE — ANESTHESIA POSTPROCEDURE EVALUATION
Patient: Cassy Causey    Procedure Summary     Date: 09/24/21 Room / Location:  [facility]  /  [facility]    Anesthesia Start: 1107 Anesthesia Stop: 1228    Procedure: CHOLECYSTECTOMY LAPAROSCOPIC INTRAOPERATIVE CHOLANGIOGRAM (N/A Abdomen) Diagnosis:       Biliary dyskinesia      (Biliary dyskinesia [K82.8])    Surgeons: Hugh Eddy Jr., MD Provider: Ascencion Marino MD    Anesthesia Type: general ASA Status: 1          Anesthesia Type: general    Vitals  Vitals Value Taken Time   /54 09/24/21 1401   Temp 36.4 °C (97.5 °F) 09/24/21 1225   Pulse 106 09/24/21 1401   Resp 16 09/24/21 1345   SpO2 100 % 09/24/21 1401   Vitals shown include unvalidated device data.        Post Anesthesia Care and Evaluation    Patient location during evaluation: bedside  Patient participation: complete - patient participated  Level of consciousness: awake  Pain management: adequate  Airway patency: patent  Anesthetic complications: No anesthetic complications    Cardiovascular status: acceptable  Respiratory status: acceptable  Hydration status: acceptable    Comments: *BP 98/47 (BP Location: Right arm, Patient Position: Lying)   Pulse 67   Temp 36.4 °C (97.5 °F) (Temporal)   Resp 16   LMP  (LMP Unknown)   SpO2 99%

## 2021-09-24 NOTE — ANESTHESIA PREPROCEDURE EVALUATION
Anesthesia Evaluation     Patient summary reviewed and Nursing notes reviewed   no history of anesthetic complications:  NPO Solid Status: > 8 hours  NPO Liquid Status: > 8 hours           Airway   Mallampati: II  Dental - normal exam     Pulmonary - negative pulmonary ROS and normal exam   Cardiovascular - negative cardio ROS and normal exam        Neuro/Psych  (+) headaches, psychiatric history Anxiety and Depression,     GI/Hepatic/Renal/Endo    (+)   renal disease stones,     Musculoskeletal     Abdominal    Substance History      OB/GYN          Other                        Anesthesia Plan    ASA 1     general     intravenous induction     Anesthetic plan, all risks, benefits, and alternatives have been provided, discussed and informed consent has been obtained with: patient.

## 2021-09-24 NOTE — OP NOTE
Surgeon: Hugh Eddy Jr., M.D.    Assistant: Zhang Galaviz CSA    An assistant was necessary and provided valuable retraction and exposure, as well as assistance with camera holding, gallbladder manipulation, and incision closure.    Pre-Operative Diagnosis:     Biliary dyskinesia [K82.8]    Post-Operative Diagnosis:    Chronic cholecystitis    Procedure Performed:     Laparoscopic cholecystectomy with intraoperative cholangiogram    Indications:     The patient is a pleasant 20-year-old female with a 5-year history of epigastric pain that feels like pressure.  She also has nausea especially postprandially.  Right upper quadrant ultrasound was normal.  A HIDA scan showed a gallbladder ejection fraction of 0%.  She presents for laparoscopic cholecystectomy with intraoperative cholangiogram.  The patient understands the indications, alternatives, risks, and benefits of the procedure and wishes to proceed.    Procedure:     The patient was identified and taken to the operating room where she was placed in the supine position on the operating table.  Monitors were placed and she underwent general endotracheal anesthesia and was appropriately monitored throughout the case by the anesthesia personnel.  The abdomen was prepped and draped in the standard surgical fashion.  Each incision was infiltrated with 0.5% Marcaine with epinephrine prior to making the incision.  A supraumbilical incision was made and carried through the skin into the subcutaneous tissue.  The abdominal wall was elevated with skin hooks and a Veress needle was inserted through the incision into the abdomen without difficulty.  The abdomen was then insufflated with low pressures encountered initially.  Once fully insufflated, the Veress needle was removed and a 5 mm port was placed in the same incision, again with traction applied to the abdominal wall using skin hooks.  The laparoscope was inserted and the area of Veress needle and port insertion  was inspected, no injury had occurred.  Attention was then turned to the upper abdomen.  A 10 mm subxiphoid port was placed by making skin incision carried through the skin into the subcutaneous tissue and inserting the port through the incision into the abdomen with direct visualization using the laparoscope.  Two 5 mm ports were placed in the right upper quadrant in the same fashion.  The liver edge was elevated and the gallbladder was visualized.  The gallbladder had a thickened wall.  The gallbladder was grasped and elevated over the liver.  Adhesions were not present.  The gallbladder was then grasped at the infundibulum and traction was applied to open the triangle of Calot.  The triangle of Calot was carefully and meticulously dissected.  The cystic duct was identified and surrounded.  A clip was placed at the infundibulum cystic duct junction.  A cholangiocatheter was introduced into the abdomen through an Angiocath.  A small incision was created in the cystic duct with scissors and the cholangiocatheter was inserted into the cystic duct.  It was secured with a clip.  A cholangiogram was then performed that confirmed our impression anatomy, showed a normal-sized, duct with no filling defects, and rapid drainage of contrast material into the duodenum.  The cholangiocatheter was removed and the cystic duct was divided after placing 2 clips proximally and dividing with the scissors.  The cystic artery was also identified, surrounded, and divided after placing 2 clips proximally and dividing distally using Bovie electrocautery.  The gallbladder was removed from the gallbladder fossa using Bovie electrocautery.  It was then passed out of the abdomen through the epigastric port site in an Endo Catch bag.  The gallbladder fossa was carefully inspected and noted to be hemostatic with no evidence of bile leak.  The abdomen was deflated and all ports were removed.  The fascia of the subxiphoid port was closed with an  0 vicryl suture in a figure-of-eight fashion.  All skin incisions were closed with a 4-0 Monocryl in a subcuticular fashion followed by benzoin and Steri-Strips.  The sponge, needle, and instrument counts were correct at the end of the case.  The patient tolerated the procedure well and was transferred to the recovery room in stable condition.    Estimated Blood Loss:      minimal    Specimens:     Order Name Source Comment Collection Info Order Time   TISSUE PATHOLOGY EXAM Gallbladder  Collected By: Hugh Eddy Jr., MD 9/24/2021 11:41 AM     Release to patient   Immediate            Hugh Eddy Jr., M.D.

## 2021-09-24 NOTE — ANESTHESIA PROCEDURE NOTES
Airway  Urgency: elective    Date/Time: 9/24/2021 11:19 AM  Airway not difficult    General Information and Staff    Patient location during procedure: OR  Anesthesiologist: Ascencion Marino MD  CRNA: Kellie Ryan CRNA    Indications and Patient Condition  Indications for airway management: airway protection    Preoxygenated: yes  MILS not maintained throughout  Mask difficulty assessment: 1 - vent by mask    Final Airway Details  Final airway type: endotracheal airway      Successful airway: ETT  Cuffed: yes   Successful intubation technique: direct laryngoscopy  Endotracheal tube insertion site: oral  Blade: [place]  Blade size: 3  ETT size (mm): 7.0  Cormack-Lehane Classification: grade I - full view of glottis  Placement verified by: chest auscultation and capnometry   Cuff volume (mL): 6  Measured from: lips  ETT/EBT  to lips (cm): 21  Number of attempts at approach: 1    Additional Comments  Pt preoxygenated prior to induction, easy mask airway, atraumatic intubation,+ ETCO2, + bs bilat,  ETT secured and connected to ventilator.

## 2021-09-27 LAB
LAB AP CASE REPORT: NORMAL
PATH REPORT.FINAL DX SPEC: NORMAL
PATH REPORT.GROSS SPEC: NORMAL

## 2021-10-07 ENCOUNTER — TELEPHONE (OUTPATIENT)
Dept: GASTROENTEROLOGY | Facility: CLINIC | Age: 21
End: 2021-10-07

## 2021-10-12 ENCOUNTER — OFFICE VISIT (OUTPATIENT)
Dept: SURGERY | Facility: CLINIC | Age: 21
End: 2021-10-12

## 2021-10-12 VITALS — BODY MASS INDEX: 20.7 KG/M2 | HEIGHT: 66 IN | WEIGHT: 128.8 LBS

## 2021-10-12 DIAGNOSIS — Z48.89 POSTOPERATIVE VISIT: Primary | ICD-10-CM

## 2021-10-12 PROCEDURE — 99024 POSTOP FOLLOW-UP VISIT: CPT | Performed by: SURGERY

## 2021-10-12 NOTE — PROGRESS NOTES
Subjective   Cassy Causey is a 20 y.o. female who returns to the office after undergoing a laparoscopic cholecystectomy with intraoperative cholangiogram on 9/24/2021.     History of Present Illness     The patient is recovering well with no significant postop symptoms.  She had abdominal pain the immediate postop period that was similar to her preop symptoms but that has completely resolved and she feels much better than she did preoperatively.  Her appetite is significantly improved and she is eating more quantities than she has for a long time.  She is not having any nausea or vomiting.  Her bowel function is normal.  Her energy level is decreased but improving.    Review of Systems   Constitutional: Positive for fatigue. Negative for activity change, appetite change and fever.   Respiratory: Negative for chest tightness and shortness of breath.    Cardiovascular: Negative for chest pain and palpitations.   Gastrointestinal: Negative for abdominal pain, constipation, diarrhea and nausea.   Skin: Negative for rash and wound.   Psychiatric/Behavioral: Negative for agitation and confusion.       Objective   Physical Exam  Constitutional:       General: She is not in acute distress.     Appearance: Normal appearance. She is not ill-appearing or toxic-appearing.   Pulmonary:      Effort: Pulmonary effort is normal. No respiratory distress.   Abdominal:      Palpations: Abdomen is soft.      Tenderness: There is no abdominal tenderness.   Skin:     Comments: Incision: intact with no evidence of infection.   Neurological:      Mental Status: She is alert.   Psychiatric:         Behavior: Behavior normal.         Assessment/Plan   The encounter diagnosis was Postoperative visit.    The patient is recovering well from her laparoscopic cholecystectomy with intraoperative cholangiogram.  At this point she has no limitations.  She will follow-up on an as-needed basis.

## 2022-03-15 RX ORDER — NORETHINDRONE ACETATE AND ETHINYL ESTRADIOL, AND FERROUS FUMARATE 1MG-20(24)
KIT ORAL
Qty: 84 TABLET | Refills: 3 | OUTPATIENT
Start: 2022-03-15

## 2022-03-20 NOTE — TELEPHONE ENCOUNTER
----- Message from SAGAR Penny sent at 12/27/2017  1:22 PM EST -----  Let her know her urine culture is neg for infection. We discussed a urology evaluation, if she would like it, let me know and I can order it.  
Pt notified. She will follow up with PCP.  
No

## 2022-06-16 NOTE — PROGRESS NOTES
US completed FABRICIO Riggins RN RDMS   High Dose Vitamin A Pregnancy And Lactation Text: High dose vitamin A therapy is contraindicated during pregnancy and breast feeding.

## 2025-01-16 ENCOUNTER — APPOINTMENT (OUTPATIENT)
Dept: GENERAL RADIOLOGY | Facility: HOSPITAL | Age: 25
End: 2025-01-16
Payer: COMMERCIAL

## 2025-01-16 ENCOUNTER — APPOINTMENT (OUTPATIENT)
Dept: CT IMAGING | Facility: HOSPITAL | Age: 25
End: 2025-01-16
Payer: COMMERCIAL

## 2025-01-16 ENCOUNTER — ANESTHESIA EVENT (OUTPATIENT)
Dept: PERIOP | Facility: HOSPITAL | Age: 25
End: 2025-01-16
Payer: COMMERCIAL

## 2025-01-16 ENCOUNTER — ANESTHESIA (OUTPATIENT)
Dept: PERIOP | Facility: HOSPITAL | Age: 25
End: 2025-01-16
Payer: COMMERCIAL

## 2025-01-16 ENCOUNTER — HOSPITAL ENCOUNTER (EMERGENCY)
Facility: HOSPITAL | Age: 25
Discharge: HOME | End: 2025-01-16
Attending: EMERGENCY MEDICINE
Payer: COMMERCIAL

## 2025-01-16 VITALS
RESPIRATION RATE: 16 BRPM | WEIGHT: 144.62 LBS | BODY MASS INDEX: 24.1 KG/M2 | HEIGHT: 65 IN | DIASTOLIC BLOOD PRESSURE: 69 MMHG | HEART RATE: 84 BPM | SYSTOLIC BLOOD PRESSURE: 110 MMHG | OXYGEN SATURATION: 99 % | TEMPERATURE: 98.4 F

## 2025-01-16 DIAGNOSIS — N20.0 KIDNEY STONE: ICD-10-CM

## 2025-01-16 DIAGNOSIS — N20.1 LEFT URETERAL STONE: Primary | ICD-10-CM

## 2025-01-16 DIAGNOSIS — R10.9 FLANK PAIN: ICD-10-CM

## 2025-01-16 LAB
ALBUMIN SERPL-MCNC: 4.6 G/DL (ref 3.5–5.2)
ALBUMIN/GLOB SERPL: 1.3 G/DL
ALP SERPL-CCNC: 60 U/L (ref 39–117)
ALT SERPL W P-5'-P-CCNC: 14 U/L (ref 1–33)
ANION GAP SERPL CALCULATED.3IONS-SCNC: 11.7 MMOL/L (ref 5–15)
AST SERPL-CCNC: 22 U/L (ref 1–32)
BACTERIA UR QL AUTO: ABNORMAL /HPF
BASOPHILS # BLD AUTO: 0.07 10*3/MM3 (ref 0–0.2)
BASOPHILS NFR BLD AUTO: 0.8 % (ref 0–1.5)
BILIRUB SERPL-MCNC: 0.6 MG/DL (ref 0–1.2)
BILIRUB UR QL STRIP: NEGATIVE
BUN SERPL-MCNC: 9 MG/DL (ref 6–20)
BUN/CREAT SERPL: 13 (ref 7–25)
CALCIUM SPEC-SCNC: 9.1 MG/DL (ref 8.6–10.5)
CHLORIDE SERPL-SCNC: 102 MMOL/L (ref 98–107)
CLARITY UR: ABNORMAL
CO2 SERPL-SCNC: 21.3 MMOL/L (ref 22–29)
COLOR UR: YELLOW
CREAT SERPL-MCNC: 0.69 MG/DL (ref 0.57–1)
D-LACTATE SERPL-SCNC: 2 MMOL/L (ref 0.5–2)
DEPRECATED RDW RBC AUTO: 45.1 FL (ref 37–54)
EGFRCR SERPLBLD CKD-EPI 2021: 123.7 ML/MIN/1.73
EOSINOPHIL # BLD AUTO: 0.07 10*3/MM3 (ref 0–0.4)
EOSINOPHIL NFR BLD AUTO: 0.8 % (ref 0.3–6.2)
ERYTHROCYTE [DISTWIDTH] IN BLOOD BY AUTOMATED COUNT: 13.9 % (ref 12.3–15.4)
GLOBULIN UR ELPH-MCNC: 3.6 GM/DL
GLUCOSE SERPL-MCNC: 88 MG/DL (ref 65–99)
GLUCOSE UR STRIP-MCNC: NEGATIVE MG/DL
HCG INTACT+B SERPL-ACNC: <0.5 MIU/ML
HCT VFR BLD AUTO: 38.4 % (ref 34–46.6)
HGB BLD-MCNC: 12 G/DL (ref 12–15.9)
HGB UR QL STRIP.AUTO: ABNORMAL
HOLD SPECIMEN: NORMAL
HOLD SPECIMEN: NORMAL
HYALINE CASTS UR QL AUTO: ABNORMAL /LPF
IMM GRANULOCYTES # BLD AUTO: 0.02 10*3/MM3 (ref 0–0.05)
IMM GRANULOCYTES NFR BLD AUTO: 0.2 % (ref 0–0.5)
KETONES UR QL STRIP: NEGATIVE
LEUKOCYTE ESTERASE UR QL STRIP.AUTO: ABNORMAL
LIPASE SERPL-CCNC: 24 U/L (ref 13–60)
LYMPHOCYTES # BLD AUTO: 1.93 10*3/MM3 (ref 0.7–3.1)
LYMPHOCYTES NFR BLD AUTO: 21 % (ref 19.6–45.3)
MCH RBC QN AUTO: 27.6 PG (ref 26.6–33)
MCHC RBC AUTO-ENTMCNC: 31.3 G/DL (ref 31.5–35.7)
MCV RBC AUTO: 88.3 FL (ref 79–97)
MONOCYTES # BLD AUTO: 0.7 10*3/MM3 (ref 0.1–0.9)
MONOCYTES NFR BLD AUTO: 7.6 % (ref 5–12)
NEUTROPHILS NFR BLD AUTO: 6.42 10*3/MM3 (ref 1.7–7)
NEUTROPHILS NFR BLD AUTO: 69.6 % (ref 42.7–76)
NITRITE UR QL STRIP: NEGATIVE
NRBC BLD AUTO-RTO: 0 /100 WBC (ref 0–0.2)
PH UR STRIP.AUTO: 6.5 [PH] (ref 5–8)
PLATELET # BLD AUTO: 201 10*3/MM3 (ref 140–450)
PMV BLD AUTO: 13 FL (ref 6–12)
POTASSIUM SERPL-SCNC: 3.8 MMOL/L (ref 3.5–5.2)
PROT SERPL-MCNC: 8.2 G/DL (ref 6–8.5)
PROT UR QL STRIP: NEGATIVE
RBC # BLD AUTO: 4.35 10*6/MM3 (ref 3.77–5.28)
RBC # UR STRIP: ABNORMAL /HPF
REF LAB TEST METHOD: ABNORMAL
SODIUM SERPL-SCNC: 135 MMOL/L (ref 136–145)
SP GR UR STRIP: 1.01 (ref 1–1.03)
SQUAMOUS #/AREA URNS HPF: ABNORMAL /HPF
TRANS CELLS #/AREA URNS HPF: ABNORMAL /HPF
UROBILINOGEN UR QL STRIP: ABNORMAL
WBC # UR STRIP: ABNORMAL /HPF
WBC NRBC COR # BLD AUTO: 9.21 10*3/MM3 (ref 3.4–10.8)
WHOLE BLOOD HOLD COAG: NORMAL
WHOLE BLOOD HOLD SPECIMEN: NORMAL

## 2025-01-16 PROCEDURE — 74420 UROGRAPHY RTRGR +-KUB: CPT | Performed by: UROLOGY

## 2025-01-16 PROCEDURE — 25510000001 IOPAMIDOL PER 1 ML: Performed by: UROLOGY

## 2025-01-16 PROCEDURE — 83605 ASSAY OF LACTIC ACID: CPT

## 2025-01-16 PROCEDURE — 25010000002 SUGAMMADEX 200 MG/2ML SOLUTION

## 2025-01-16 PROCEDURE — 36415 COLL VENOUS BLD VENIPUNCTURE: CPT

## 2025-01-16 PROCEDURE — 25010000002 MIDAZOLAM PER 1MG: Performed by: ANESTHESIOLOGY

## 2025-01-16 PROCEDURE — 25010000002 ONDANSETRON PER 1 MG

## 2025-01-16 PROCEDURE — 80053 COMPREHEN METABOLIC PANEL: CPT | Performed by: EMERGENCY MEDICINE

## 2025-01-16 PROCEDURE — 83690 ASSAY OF LIPASE: CPT | Performed by: EMERGENCY MEDICINE

## 2025-01-16 PROCEDURE — 25010000002 LIDOCAINE PF 2% 2 % SOLUTION

## 2025-01-16 PROCEDURE — 25810000003 LACTATED RINGERS SOLUTION

## 2025-01-16 PROCEDURE — C1769 GUIDE WIRE: HCPCS | Performed by: UROLOGY

## 2025-01-16 PROCEDURE — 74176 CT ABD & PELVIS W/O CONTRAST: CPT

## 2025-01-16 PROCEDURE — 52356 CYSTO/URETERO W/LITHOTRIPSY: CPT | Performed by: UROLOGY

## 2025-01-16 PROCEDURE — 96374 THER/PROPH/DIAG INJ IV PUSH: CPT

## 2025-01-16 PROCEDURE — 25010000002 FENTANYL CITRATE (PF) 50 MCG/ML SOLUTION

## 2025-01-16 PROCEDURE — 87086 URINE CULTURE/COLONY COUNT: CPT

## 2025-01-16 PROCEDURE — 85025 COMPLETE CBC W/AUTO DIFF WBC: CPT | Performed by: EMERGENCY MEDICINE

## 2025-01-16 PROCEDURE — C1758 CATHETER, URETERAL: HCPCS | Performed by: UROLOGY

## 2025-01-16 PROCEDURE — C1894 INTRO/SHEATH, NON-LASER: HCPCS | Performed by: UROLOGY

## 2025-01-16 PROCEDURE — 99285 EMERGENCY DEPT VISIT HI MDM: CPT

## 2025-01-16 PROCEDURE — 25010000002 KETOROLAC TROMETHAMINE PER 15 MG

## 2025-01-16 PROCEDURE — 96376 TX/PRO/DX INJ SAME DRUG ADON: CPT

## 2025-01-16 PROCEDURE — 76000 FLUOROSCOPY <1 HR PHYS/QHP: CPT

## 2025-01-16 PROCEDURE — 81001 URINALYSIS AUTO W/SCOPE: CPT | Performed by: EMERGENCY MEDICINE

## 2025-01-16 PROCEDURE — 25810000003 LACTATED RINGERS PER 1000 ML

## 2025-01-16 PROCEDURE — 25010000002 DEXAMETHASONE PER 1 MG

## 2025-01-16 PROCEDURE — C2617 STENT, NON-COR, TEM W/O DEL: HCPCS | Performed by: UROLOGY

## 2025-01-16 PROCEDURE — 25010000002 CEFTRIAXONE PER 250 MG

## 2025-01-16 PROCEDURE — 84702 CHORIONIC GONADOTROPIN TEST: CPT | Performed by: EMERGENCY MEDICINE

## 2025-01-16 PROCEDURE — 96375 TX/PRO/DX INJ NEW DRUG ADDON: CPT

## 2025-01-16 PROCEDURE — 25010000002 PROPOFOL 10 MG/ML EMULSION

## 2025-01-16 PROCEDURE — 25010000002 FENTANYL CITRATE (PF) 50 MCG/ML SOLUTION: Performed by: EMERGENCY MEDICINE

## 2025-01-16 RX ORDER — CEPHALEXIN 500 MG/1
500 CAPSULE ORAL 2 TIMES DAILY
Qty: 14 CAPSULE | Refills: 0 | Status: SHIPPED | OUTPATIENT
Start: 2025-01-16 | End: 2025-01-23

## 2025-01-16 RX ORDER — SODIUM CHLORIDE, SODIUM LACTATE, POTASSIUM CHLORIDE, CALCIUM CHLORIDE 600; 310; 30; 20 MG/100ML; MG/100ML; MG/100ML; MG/100ML
INJECTION, SOLUTION INTRAVENOUS CONTINUOUS PRN
Status: DISCONTINUED | OUTPATIENT
Start: 2025-01-16 | End: 2025-01-16 | Stop reason: SURG

## 2025-01-16 RX ORDER — PETROLATUM,WHITE
OINTMENT IN PACKET (GRAM) TOPICAL AS NEEDED
Status: DISCONTINUED | OUTPATIENT
Start: 2025-01-16 | End: 2025-01-16 | Stop reason: SURG

## 2025-01-16 RX ORDER — SODIUM CHLORIDE, SODIUM LACTATE, POTASSIUM CHLORIDE, CALCIUM CHLORIDE 600; 310; 30; 20 MG/100ML; MG/100ML; MG/100ML; MG/100ML
9 INJECTION, SOLUTION INTRAVENOUS CONTINUOUS PRN
Status: CANCELLED | OUTPATIENT
Start: 2025-01-16 | End: 2025-01-17

## 2025-01-16 RX ORDER — HYDROCODONE BITARTRATE AND ACETAMINOPHEN 5; 325 MG/1; MG/1
1 TABLET ORAL EVERY 6 HOURS PRN
COMMUNITY
End: 2025-01-16

## 2025-01-16 RX ORDER — ACETAMINOPHEN 500 MG
1000 TABLET ORAL ONCE
Status: DISCONTINUED | OUTPATIENT
Start: 2025-01-16 | End: 2025-01-16

## 2025-01-16 RX ORDER — PROPOFOL 10 MG/ML
VIAL (ML) INTRAVENOUS AS NEEDED
Status: DISCONTINUED | OUTPATIENT
Start: 2025-01-16 | End: 2025-01-16 | Stop reason: SURG

## 2025-01-16 RX ORDER — ONDANSETRON 2 MG/ML
4 INJECTION INTRAMUSCULAR; INTRAVENOUS ONCE AS NEEDED
Status: DISCONTINUED | OUTPATIENT
Start: 2025-01-16 | End: 2025-01-16 | Stop reason: HOSPADM

## 2025-01-16 RX ORDER — FENTANYL CITRATE 50 UG/ML
25 INJECTION, SOLUTION INTRAMUSCULAR; INTRAVENOUS
Status: DISCONTINUED | OUTPATIENT
Start: 2025-01-16 | End: 2025-01-16 | Stop reason: HOSPADM

## 2025-01-16 RX ORDER — SERTRALINE HYDROCHLORIDE 25 MG/1
25 TABLET, FILM COATED ORAL DAILY
COMMUNITY

## 2025-01-16 RX ORDER — ROCURONIUM BROMIDE 10 MG/ML
INJECTION, SOLUTION INTRAVENOUS AS NEEDED
Status: DISCONTINUED | OUTPATIENT
Start: 2025-01-16 | End: 2025-01-16 | Stop reason: SURG

## 2025-01-16 RX ORDER — SCOLOPAMINE TRANSDERMAL SYSTEM 1 MG/1
1 PATCH, EXTENDED RELEASE TRANSDERMAL ONCE
Status: DISCONTINUED | OUTPATIENT
Start: 2025-01-16 | End: 2025-01-16 | Stop reason: HOSPADM

## 2025-01-16 RX ORDER — ACETAMINOPHEN 500 MG
1000 TABLET ORAL ONCE
Status: COMPLETED | OUTPATIENT
Start: 2025-01-16 | End: 2025-01-16

## 2025-01-16 RX ORDER — HYDROCODONE BITARTRATE AND ACETAMINOPHEN 10; 325 MG/1; MG/1
1 TABLET ORAL EVERY 6 HOURS PRN
Qty: 20 TABLET | Refills: 0 | Status: SHIPPED | OUTPATIENT
Start: 2025-01-16

## 2025-01-16 RX ORDER — MAGNESIUM HYDROXIDE 1200 MG/15ML
LIQUID ORAL AS NEEDED
Status: DISCONTINUED | OUTPATIENT
Start: 2025-01-16 | End: 2025-01-16 | Stop reason: HOSPADM

## 2025-01-16 RX ORDER — IOPAMIDOL 510 MG/ML
INJECTION, SOLUTION INTRAVASCULAR AS NEEDED
Status: DISCONTINUED | OUTPATIENT
Start: 2025-01-16 | End: 2025-01-16 | Stop reason: HOSPADM

## 2025-01-16 RX ORDER — KETOROLAC TROMETHAMINE 10 MG/1
10 TABLET, FILM COATED ORAL 3 TIMES DAILY PRN
COMMUNITY
Start: 2025-01-15

## 2025-01-16 RX ORDER — PROMETHAZINE HYDROCHLORIDE 12.5 MG/1
25 TABLET ORAL ONCE AS NEEDED
Status: DISCONTINUED | OUTPATIENT
Start: 2025-01-16 | End: 2025-01-16 | Stop reason: HOSPADM

## 2025-01-16 RX ORDER — LIDOCAINE HYDROCHLORIDE 20 MG/ML
INJECTION, SOLUTION EPIDURAL; INFILTRATION; INTRACAUDAL; PERINEURAL AS NEEDED
Status: DISCONTINUED | OUTPATIENT
Start: 2025-01-16 | End: 2025-01-16 | Stop reason: SURG

## 2025-01-16 RX ORDER — FENTANYL CITRATE 50 UG/ML
50 INJECTION, SOLUTION INTRAMUSCULAR; INTRAVENOUS ONCE
Status: COMPLETED | OUTPATIENT
Start: 2025-01-16 | End: 2025-01-16

## 2025-01-16 RX ORDER — MEPERIDINE HYDROCHLORIDE 25 MG/ML
12.5 INJECTION INTRAMUSCULAR; INTRAVENOUS; SUBCUTANEOUS
Status: DISCONTINUED | OUTPATIENT
Start: 2025-01-16 | End: 2025-01-16 | Stop reason: HOSPADM

## 2025-01-16 RX ORDER — ONDANSETRON 2 MG/ML
INJECTION INTRAMUSCULAR; INTRAVENOUS AS NEEDED
Status: DISCONTINUED | OUTPATIENT
Start: 2025-01-16 | End: 2025-01-16 | Stop reason: SURG

## 2025-01-16 RX ORDER — DEXMEDETOMIDINE HYDROCHLORIDE 100 UG/ML
INJECTION, SOLUTION INTRAVENOUS AS NEEDED
Status: DISCONTINUED | OUTPATIENT
Start: 2025-01-16 | End: 2025-01-16 | Stop reason: SURG

## 2025-01-16 RX ORDER — MIDAZOLAM HYDROCHLORIDE 2 MG/2ML
2 INJECTION, SOLUTION INTRAMUSCULAR; INTRAVENOUS ONCE
Status: COMPLETED | OUTPATIENT
Start: 2025-01-16 | End: 2025-01-16

## 2025-01-16 RX ORDER — FENTANYL CITRATE 50 UG/ML
25 INJECTION, SOLUTION INTRAMUSCULAR; INTRAVENOUS ONCE
Status: COMPLETED | OUTPATIENT
Start: 2025-01-16 | End: 2025-01-16

## 2025-01-16 RX ORDER — TAMSULOSIN HYDROCHLORIDE 0.4 MG/1
1 CAPSULE ORAL DAILY
COMMUNITY
Start: 2025-01-15 | End: 2025-01-16 | Stop reason: HOSPADM

## 2025-01-16 RX ORDER — KETOROLAC TROMETHAMINE 15 MG/ML
15 INJECTION, SOLUTION INTRAMUSCULAR; INTRAVENOUS ONCE
Status: COMPLETED | OUTPATIENT
Start: 2025-01-16 | End: 2025-01-16

## 2025-01-16 RX ORDER — DEXAMETHASONE SODIUM PHOSPHATE 4 MG/ML
INJECTION, SOLUTION INTRA-ARTICULAR; INTRALESIONAL; INTRAMUSCULAR; INTRAVENOUS; SOFT TISSUE AS NEEDED
Status: DISCONTINUED | OUTPATIENT
Start: 2025-01-16 | End: 2025-01-16 | Stop reason: SURG

## 2025-01-16 RX ORDER — SODIUM CHLORIDE 0.9 % (FLUSH) 0.9 %
10 SYRINGE (ML) INJECTION AS NEEDED
Status: DISCONTINUED | OUTPATIENT
Start: 2025-01-16 | End: 2025-01-16 | Stop reason: HOSPADM

## 2025-01-16 RX ORDER — PROMETHAZINE HYDROCHLORIDE 25 MG/1
25 SUPPOSITORY RECTAL ONCE AS NEEDED
Status: DISCONTINUED | OUTPATIENT
Start: 2025-01-16 | End: 2025-01-16 | Stop reason: HOSPADM

## 2025-01-16 RX ORDER — ONDANSETRON 2 MG/ML
4 INJECTION INTRAMUSCULAR; INTRAVENOUS ONCE
Status: COMPLETED | OUTPATIENT
Start: 2025-01-16 | End: 2025-01-16

## 2025-01-16 RX ORDER — OXYCODONE HYDROCHLORIDE 5 MG/1
5 TABLET ORAL
Status: DISCONTINUED | OUTPATIENT
Start: 2025-01-16 | End: 2025-01-16 | Stop reason: HOSPADM

## 2025-01-16 RX ORDER — FENTANYL CITRATE 50 UG/ML
INJECTION, SOLUTION INTRAMUSCULAR; INTRAVENOUS AS NEEDED
Status: DISCONTINUED | OUTPATIENT
Start: 2025-01-16 | End: 2025-01-16 | Stop reason: SURG

## 2025-01-16 RX ADMIN — Medication 1 PACKAGE: at 16:51

## 2025-01-16 RX ADMIN — SODIUM CHLORIDE 1000 MG: 9 INJECTION INTRAMUSCULAR; INTRAVENOUS; SUBCUTANEOUS at 13:43

## 2025-01-16 RX ADMIN — DEXMEDETOMIDINE HYDROCHLORIDE 5 MCG: 100 INJECTION, SOLUTION, CONCENTRATE INTRAVENOUS at 16:36

## 2025-01-16 RX ADMIN — PROPOFOL 160 MG: 10 INJECTION, EMULSION INTRAVENOUS at 16:10

## 2025-01-16 RX ADMIN — MIDAZOLAM HYDROCHLORIDE 2 MG: 1 INJECTION, SOLUTION INTRAMUSCULAR; INTRAVENOUS at 15:57

## 2025-01-16 RX ADMIN — FENTANYL CITRATE 25 MCG: 50 INJECTION, SOLUTION INTRAMUSCULAR; INTRAVENOUS at 16:27

## 2025-01-16 RX ADMIN — FENTANYL CITRATE 25 MCG: 50 INJECTION, SOLUTION INTRAMUSCULAR; INTRAVENOUS at 12:45

## 2025-01-16 RX ADMIN — KETOROLAC TROMETHAMINE 15 MG: 15 INJECTION, SOLUTION INTRAMUSCULAR; INTRAVENOUS at 13:42

## 2025-01-16 RX ADMIN — ONDANSETRON 4 MG: 2 INJECTION INTRAMUSCULAR; INTRAVENOUS at 12:43

## 2025-01-16 RX ADMIN — FENTANYL CITRATE 50 MCG: 50 INJECTION, SOLUTION INTRAMUSCULAR; INTRAVENOUS at 16:10

## 2025-01-16 RX ADMIN — DEXMEDETOMIDINE HYDROCHLORIDE 10 MCG: 100 INJECTION, SOLUTION, CONCENTRATE INTRAVENOUS at 16:10

## 2025-01-16 RX ADMIN — FENTANYL CITRATE 25 MCG: 50 INJECTION, SOLUTION INTRAMUSCULAR; INTRAVENOUS at 17:26

## 2025-01-16 RX ADMIN — LIDOCAINE HYDROCHLORIDE 80 MG: 20 INJECTION, SOLUTION INTRAVENOUS at 16:10

## 2025-01-16 RX ADMIN — FENTANYL CITRATE 50 MCG: 50 INJECTION, SOLUTION INTRAMUSCULAR; INTRAVENOUS at 14:08

## 2025-01-16 RX ADMIN — SUGAMMADEX 200 MG: 100 INJECTION, SOLUTION INTRAVENOUS at 16:51

## 2025-01-16 RX ADMIN — SODIUM CHLORIDE, POTASSIUM CHLORIDE, SODIUM LACTATE AND CALCIUM CHLORIDE: 600; 310; 30; 20 INJECTION, SOLUTION INTRAVENOUS at 16:06

## 2025-01-16 RX ADMIN — FENTANYL CITRATE 25 MCG: 50 INJECTION, SOLUTION INTRAMUSCULAR; INTRAVENOUS at 16:02

## 2025-01-16 RX ADMIN — ACETAMINOPHEN 500 MG: 500 TABLET ORAL at 16:03

## 2025-01-16 RX ADMIN — ROCURONIUM BROMIDE 50 MG: 50 INJECTION INTRAVENOUS at 16:10

## 2025-01-16 RX ADMIN — SCOPOLAMINE 1 PATCH: 1.5 PATCH, EXTENDED RELEASE TRANSDERMAL at 15:57

## 2025-01-16 RX ADMIN — ONDANSETRON 4 MG: 2 INJECTION INTRAMUSCULAR; INTRAVENOUS at 16:31

## 2025-01-16 RX ADMIN — SODIUM CHLORIDE, POTASSIUM CHLORIDE, SODIUM LACTATE AND CALCIUM CHLORIDE 1000 ML: 600; 310; 30; 20 INJECTION, SOLUTION INTRAVENOUS at 14:14

## 2025-01-16 RX ADMIN — DEXAMETHASONE SODIUM PHOSPHATE 8 MG: 4 INJECTION, SOLUTION INTRAMUSCULAR; INTRAVENOUS at 16:31

## 2025-01-16 NOTE — ED NOTES
Pt was seen at different facility yesterday for same issue. Pt states she is currently on her menstrual cycle. Pt also states she has h/o kidney stones and was seeing a urologist but hasn't been recently as she hasn't had any problems until recently

## 2025-01-16 NOTE — ANESTHESIA POSTPROCEDURE EVALUATION
Patient: Cassy Causey    Procedure Summary       Date: 01/16/25 Room / Location: [facility] OR  / [facility] OR    Anesthesia Start: 1606 Anesthesia Stop: 1709    Procedure: CYSTOSCOPY, LEFT URETEROSCOPY, LASER LITHOTRIPSY, RETROGRADE PYELOGRAM, LEFT URETERAL STENT PLACEMENT (Left: Urethra) Diagnosis:       Kidney stone      (Kidney stone [N20.0])    Surgeons: Mellissa Lawrence MD Provider: Sabrina Ruiz MD    Anesthesia Type: general ASA Status: 2            Anesthesia Type: general    Vitals  Vitals Value Taken Time   /70 01/16/25 1810   Temp 36.4 °C (97.6 °F) 01/16/25 1810   Pulse 73 01/16/25 1811   Resp 14 01/16/25 1810   SpO2 98 % 01/16/25 1811   Vitals shown include unfiled device data.        Post Anesthesia Care and Evaluation    Patient location during evaluation: bedside  Patient participation: complete - patient participated  Level of consciousness: awake  Pain management: adequate    Airway patency: patent  PONV Status: none  Cardiovascular status: acceptable and stable  Respiratory status: acceptable  Hydration status: acceptable

## 2025-01-16 NOTE — DISCHARGE INSTRUCTIONS
DISCHARGE INSTRUCTIONS  Extracorporeal Shock Wave Lithotripsy (ESWL)/ Ureteroscopy Lasertripsy      For your surgery you had:  General anesthesia (you may have a sore throat for the first 24 hours)  May remove nausea patch in 24-72 hours. Wash hands. Do not touch eyes.  You received a medicated path for nausea prevention today (behind your ear). It is recommended that you remove it 24-48 hours post-operatively. It must be removed within 72 hours.   You have received an anesthesia medication today that can cause hormonal forms of birth control to be ineffective. You should use a different form of birth control (to prevent pregnancy) for 7 days.   You may experience dizziness, drowsiness, or lightheadedness for several hours following surgery.  Do not stay alone today or tonight.  Limit your activity for 24 hours.  You should not drive or operate machinery, drink alcohol, or sign legally binding documents for 24 hours or while you are taking pain medication.  Resume your diet slowly.  Follow any special dietary instructions you may have been given by your doctor.      NOTIFY YOUR DOCTOR IF YOU EXPERIENCE ANY OF THE FOLLOWING:  Temperature greater than 101 degrees Fahrenheit  Shaking Chills  Redness or excessive drainage from incision  Nausea, vomiting and/or pain that is not controlled by prescribed medications  Increase in bleeding or bleeding that is excessive  Unable to urinate in 6 hours after surgery  If unable to reach your doctor, please go to the closest Emergency Room  Strain urine if instructed by physician.  Collect any fragments and take with you on your scheduled appointment. You may pass stone pieces or small blood clots.  Blood in your urine is normal.  It could be light pink to cherry color.  Drink 6-8 glasses of fluid each day to assist with passing of stone fragments.  Back pain is common.  It may feel like a dull ache or back spasm.  Urine will be bloody for several days.  Slight redness or  bruising may be noticed on treated side.  If you have difficulty urinating, try sitting in a bathtub of warm water.    If you have a stent, it must be managed by your urologist.  Do NOT forget.  Medications per physician instructions as indicated on Discharge Medication Information Sheet.    Last dose of pain medication was given at:  Tylenol 1000 mg given at 4:03. Do not exceed 4000 mg in a 24 hour period.  Toradol given at 1:42. May have ibuprofen at 4:42 pm if needed.      SPECIAL INSTRUCTIONS:   Dr. Lawrence to call in [place].        URETERAL STENT TEACHING SHEET   Frequently Asked Questions about Ureteral Stents          What is a ureteral stent?  A ureteral stent is a small, soft tube that is about 10-12 inches long and about as big around as a swizzle stick. It is placed in the ureter, which is the muscular tube that drains urine from the kidney to the bladder.  Each end of the stent is shaped like a pigtail. One end of the tube sits inside the kidney, and the other end sits in the bladder. The purpose of the stent is to hold the ureter open and maintain proper drainage of urine.  It usually is temporary but may be used long term.  This decision will be made by your doctor.  When is the stent used?  A stent is used in a number of situations.  A stent is placed if the doctor is concerned that urine might not drain well through the ureter, due to blockage or as a reaction to surgery.  Stents usually will be placed in a ureter that has been irritated during a procedure that involves removal of a stone.  Stents for this reason are usually left in place for about a week.  These stents ensure that the ureter does not spasm and collapse after the trauma of the procedure.  Does the stent cause any symptoms?  Many patients do feel the stent.  Most commonly there is bladder irritation, typically causing frequent and/or uncomfortable urination and a sensation of pressure over the bladder or in the lower abdomen. Bloody  urine is common. Some patients experience pain in the kidney during urination. There can be urinary tract infections associated with the stent so it is very important that you practice good hygiene. Once the stent is removed, all of the symptoms associated with the stent will quite rapidly disappear (oftentimes immediately). While the stent is in place, you may carry on with most normal activities, including work.  Strenuous activity may cause discomfort. Showering is preferred to tub baths while your stent is in place. If you must take a tub bath, do not use bubble baths or oils as they may lead to infection.       When should the stent be removed?  In some cases the stent can be removed just a few days after the procedure, while in other cases your doctor may recommend that it stay in place for longer periods of time.  You must follow-up with your doctor as directed when you have a stent since stents left in place for too long can lead to blockage, stone formation, urinary infections and ultimately, kidney failure if they are not removed. If your stent passes by itself when you urinate, or you inadvertently pull the string and begin to have large amounts of urine leakage, follow the procedure below to remove the stent.  How is the stent removed?  In some instances, your doctor may decide to leave a string on the stent.  The string is attached to the stent and the string comes out of the urethra (the natural hole where urine exits the body).  The doctor may tell you to remove the stent at home on a given day.  Stents with the string may be removed in a matter of seconds by pulling on the string.  When removing the stent, constant, steady force should be applied, to avoid starting and stopping. Something should be placed below the patient to catch any urine that leaks during removal.  You may choose to remove the stent in the shower, just be sure to have someone close at hand in case you become weak or dizzy.  After  the stent is removed, it should be placed in a sealed bag and taken with you to your next office visit.  On the rare occasion that the string breaks and the stent doesn't come out, you should call your doctors office. You should urinate within 4-6 hours after your stent is removed. For this reason, your stent should be removed early in the day.  If you are unable to urinate within 6 hours, call your doctor.   Stents without a string can be removed in the office using a cystoscope. Cystoscopy involves placement of a small flexible tube through the urethra (the hole where urine exits the body). The procedure, which usually only takes a few minutes and causes little discomfort, is performed in the office. Most patients tolerate having the stent removed using only a topical anesthetic instilled into the urethra. Since no IV line is inserted and there is no anesthesia, you do not have to be accompanied by anyone else and you can eat normally before and after the procedure.  Your doctor may choose to have you return to the hospital to have your stent removed. In this case, you will receive anesthesia and must not eat or drink anything after midnight.

## 2025-01-16 NOTE — OP NOTE
URETEROSCOPY LASER LITHOTRIPSY WITH STENT INSERTION  Procedure Report    Patient Name:  Cassy Causey  YOB: 2000    Date of Surgery:  1/16/2025     Indications: Left ureteral stone    Pre-op Diagnosis:   Kidney stone [N20.0]       Post-Op Diagnosis Codes:     * Kidney stone [N20.0]    Procedure/CPT® Codes:      Procedure(s):  CYSTOSCOPY, LEFT URETEROSCOPY, LASER LITHOTRIPSY, RETROGRADE PYELOGRAM, LEFT URETERAL STENT PLACEMENT        Staff:  Surgeon(s):  Mellissa Lawrence MD         Anesthesia: General    Estimated Blood Loss: none    Implants:    Implant Name Type Inv. Item Serial No.  Lot No. LRB No. Used Action   STNT URETRL ASCERTA 6F 26CM - [id number] Stent STNT URETRL ASCERTA 6F 26CM  [employer] [id number] Left 1 Implanted       Specimen:          None        Findings: Left ureteral stone    Complications: None    Description of Procedure: After informed consent was obtained patient was taken to the operating room.  General anesthesia was administered.  She was placed in a dorsolithotomy position.  Groin is prepped and draped in a sterile fashion.  Cystoscopy was performed the bladder and both ureteral orifice ease are normal.  I passed a sensor tip guidewire into the left ureter which went to the kidney.  But I could not pass a 5 Indonesian ureteral catheter over the guidewire as the stone was completely obstructing the ureter.  I then used the navigator the inner sheath to dilate the left distal ureter to the stone the stone was not mobile.  I then went in with a semirigid scope into the left ureter and visualized the stone which was completely and tightly impacted the distal ureter there was very little room to operate.  With a 360 holmium laser probe I started fragmenting the stone and it was very difficult as the space was very tight and limited but I did fragment the stone completely and I went to the proximal ureter with the scope and did not see any other stones.  I  removed the ureteroscope past the ureteral catheter over the guidewire to the left renal pelvis injected some contrast and confirmed placement in the collecting system.  I then reinserted the ureteroscope and examined the area the mucosa was frayed but there was no perforation noted in the ureter.  Significant fraying of the mucosa was noted around where the stone was completely impacted in the distal ureter.  The ureteroscope was removed under direct vision.  Cystoscope was replaced over the guidewire a 6 Gabonese by 26 cm stent was passed.  Fluoroscopy showed the proximal end to be in good position cystoscopy showed the distal end to be in good position.  The bladder was drained the scope was removed.  Patient tolerated the procedure well there were no complications she was extubated stable awake alert in good condition when transported to recovery room.                  Mellissa Lawrence MD     Date: 1/16/2025  Time: 17:01 EST

## 2025-01-16 NOTE — ED PROVIDER NOTES
Time: 12:31 PM EST  Date of encounter:  1/16/2025  Independent Historian/Clinical History and Information was obtained by:   Patient    History is limited by: N/A    Chief Complaint: Flank pain      History of Present Illness:  Patient is a 24 y.o. year old female who presents to the emergency department for evaluation of left flank pain.  Patient reports she was seen in the emergency department at [facility] yesterday for flank pain and bladder pain.  Reports she was told she had 4 kidney stones.  States they did not do much for her except give her hydrocodone and sent her home.  She reports the pain has worsened, she is having nausea and vomiting.  Reports she feels that she passed 2 of the stones on her own at home.  Reports extensive history of kidney stones.  Denies fever.  Reports some urinary problems with pressure.  Denies vaginal discharge.      Patient Care Team  Primary Care Provider: System, Provider Not In    Past Medical History:     Allergies   Allergen Reactions    Dilaudid [Hydromorphone] Other (See Comments)     Pt reports causes severe stomach cramps    Morphine Other (See Comments)     Causes severe stomach spasms per pt.      Past Medical History:   Diagnosis Date    Alternating constipation and diarrhea     Anemia     Anxiety     Bilateral ovarian cysts     Chlamydia     IC (interstitial cystitis)     Intractable vomiting     Kidney stone     Migraine     Nonfunctioning gallbladder     Periumbilical abdominal pain      Past Surgical History:   Procedure Laterality Date    CHOLECYSTECTOMY WITH INTRAOPERATIVE CHOLANGIOGRAM N/A 9/24/2021    Procedure: CHOLECYSTECTOMY LAPAROSCOPIC INTRAOPERATIVE CHOLANGIOGRAM;  Surgeon: Hugh Eddy Jr., MD;  Location: [facility] OR [facility];  Service: General;  Laterality: N/A;    COLONOSCOPY N/A 8/2/2021    Procedure: COLONOSCOPY to cecum and ti with cold bxs;  Surgeon: Caesar Sorensen MD;  Location: [facility] ENDOSCOPY;  Service: Gastroenterology;  Laterality: N/A;  PRE:  Abd Pain, Alternating constipation/diarrhea  POST: Normal    DENTAL PROCEDURE      ENDOSCOPY N/A 8/2/2021    Procedure: ESOPHAGOGASTRODUODENOSCOPY with cold bxs;  Surgeon: Caesar Sorensen MD;  Location: [facility] ENDOSCOPY;  Service: Gastroenterology;  Laterality: N/A;  PRE: Abd Pain, N&V  POST: Gastritis    KIDNEY STONE SURGERY Right 08/2019    Body rejected stent that was placed.     Family History   Problem Relation Age of Onset    Brain cancer Father     Hypertension Father     Hypertension Paternal Grandfather     Diabetes Maternal Grandfather     Hypertension Maternal Grandfather     Breast cancer Neg Hx     Ovarian cancer Neg Hx     Uterine cancer Neg Hx     Colon cancer Neg Hx     Deep vein thrombosis Neg Hx     Pulmonary embolism Neg Hx     Malig Hyperthermia Neg Hx        Home Medications:  Prior to Admission medications    Medication Sig Start Date End Date Taking? Authorizing Provider   ketorolac (TORADOL) 10 MG tablet TAKE ONE TABLET BY MOUTH THREE TIMES PER DAY AS NEEDED FOR PAIN (DO NOT EXCEED 40 MG PER DAY AND 5 DAYS DURATION FOR ALL DOSE FORMS) 1/15/25  Yes Leona Monteiro MD   tamsulosin (FLOMAX) 0.4 MG capsule 24 hr capsule Take 1 capsule by mouth Daily. 1/15/25  Yes Leona Monteiro MD   [employer] FIBER GUMMY BEARS CHILDREN PO Take 1 tablet by mouth Daily.    Leona Monteiro MD   HYDROcodone-acetaminophen (NORCO) 5-325 MG per tablet Take 1 tablet by mouth Every 6 (Six) Hours As Needed.    Leona Monteiro MD   Lactobacillus (PROBIOTIC ACIDOPHILUS PO) Take 1 tablet by mouth Daily.    Leona Monteiro MD   norethindrone-ethinyl estradiol-ferrous fumarate (LOESTIN 24 FE) 1-20 MG-MCG(24) per tablet Take 1 tablet by mouth Daily. 6/17/21 6/17/22  Tosha Milan PA        Social History:   Social History     Tobacco Use    Smoking status: Never    Smokeless tobacco: Never   Vaping Use    Vaping status: Never Used   Substance Use Topics    Alcohol use: Yes     Comment: occ    Drug  "use: No         Review of Systems:  Review of Systems   Constitutional:  Positive for activity change and appetite change. Negative for chills, diaphoresis and fever.   HENT:  Negative for congestion, ear pain, sinus pain and sore throat.    Eyes:  Negative for photophobia and visual disturbance.   Respiratory:  Negative for cough and shortness of breath.    Cardiovascular:  Negative for chest pain, palpitations and leg swelling.   Gastrointestinal:  Positive for abdominal pain, nausea and vomiting. Negative for abdominal distention, constipation and diarrhea.   Genitourinary:  Positive for difficulty urinating, flank pain, pelvic pain and urgency. Negative for dysuria and hematuria.   Musculoskeletal:  Negative for arthralgias, back pain and neck pain.   Skin:  Negative for color change and rash.   Neurological:  Negative for dizziness and headaches.   Psychiatric/Behavioral:  Negative for agitation and confusion.         Physical Exam:  /82   Pulse 76   Temp 99.1 °F (37.3 °C) (Oral)   Resp 16   Ht 165.1 cm (65\")   Wt 65.6 kg (144 lb 10 oz)   LMP 01/15/2025 (Exact Date)   SpO2 99%   Breastfeeding No   BMI 24.07 kg/m²     Physical Exam  Vitals and nursing note reviewed.   Constitutional:       Comments: Patient anxious and tearful throughout examination   HENT:      Head: Normocephalic and atraumatic.      Mouth/Throat:      Mouth: Mucous membranes are moist.   Eyes:      Extraocular Movements: Extraocular movements intact.      Pupils: Pupils are equal, round, and reactive to light.   Cardiovascular:      Rate and Rhythm: Normal rate and regular rhythm.      Heart sounds: No murmur heard.     No friction rub. No gallop.   Pulmonary:      Effort: Pulmonary effort is normal. No respiratory distress.      Breath sounds: Normal breath sounds. No wheezing, rhonchi or rales.   Abdominal:      General: Abdomen is flat. There is no distension.      Palpations: Abdomen is soft.      Tenderness: There is " abdominal tenderness in the periumbilical area, suprapubic area and left lower quadrant.   Musculoskeletal:      Cervical back: Neck supple.   Skin:     General: Skin is warm and dry.      Capillary Refill: Capillary refill takes less than 2 seconds.   Neurological:      General: No focal deficit present.      Mental Status: She is alert and oriented to person, place, and time.   Psychiatric:         Mood and Affect: Mood normal.         Behavior: Behavior normal.                    Medical Decision Making:      Comorbidities that affect care:    Kidney stones    External Notes reviewed:    Previous Clinic Note: General Surgery follow-up note 10/2020 1S/P laparoscopic cholecystectomy      The following orders were placed and all results were independently analyzed by me:  Orders Placed This Encounter   Procedures    Urine Culture - Urine,    CT Abdomen Pelvis Without Contrast    [facility] Draw    Comprehensive Metabolic Panel    Lipase    Urinalysis With Microscopic If Indicated (No Culture) - Urine, Clean Catch    hCG, Quantitative, Pregnancy    CBC Auto Differential    Urinalysis, Microscopic Only - Urine, Clean Catch    Lactic Acid, Plasma    NPO Diet NPO Type: Strict NPO    Undress & Gown    Obtain Informed Consent    IP General Consult (Use specialty-specific consult if known)    Insert Peripheral IV    CBC & Differential    Green Top (Gel)    Lavender Top    Gold Top - SST    Light Blue Top       Medications Given in the Emergency Department:  Medications   sodium chloride 0.9 % flush 10 mL ( Intravenous MAR Hold 1/16/25 1618)   scopolamine patch 1 mg/72 hr (1 patch Transdermal Medication Applied 1/16/25 1557)   ondansetron (ZOFRAN) injection 4 mg (4 mg Intravenous Given 1/16/25 1243)   fentaNYL citrate (PF) (SUBLIMAZE) injection 25 mcg (25 mcg Intravenous Given 1/16/25 1245)   cefTRIAXone (ROCEPHIN) in NS 1 gram/10ml IV PUSH syringe (1,000 mg Intravenous Given 1/16/25 1343)   ketorolac (TORADOL) injection 15 mg  (15 mg Intravenous Given 1/16/25 1342)   fentaNYL citrate (PF) (SUBLIMAZE) injection 50 mcg (50 mcg Intravenous Given 1/16/25 1408)   lactated ringers bolus 1,000 mL (1,000 mL Intravenous New Bag 1/16/25 1414)   acetaminophen (TYLENOL) tablet 1,000 mg (500 mg Oral Given 1/16/25 1603)   Midazolam HCl (PF) (VERSED) injection 2 mg (2 mg Intravenous Given 1/16/25 1557)        ED Course:    ED Course as of 01/16/25 1631   Thu Jan 16, 2025   1353 Bacteria, UA(!): 2+ [AS]   1353 WBC, UA(!): 3-5 [AS]   1353 CT Abdomen Pelvis Without Contrast  5 mm left UVJ stone with obstructive uropathy [AS]   1353 Unable to get patient's pain under control, will consult urology [AS]      ED Course User Index  [AS] Loren Weaver, MAC       Labs:    Lab Results (last 24 hours)       Procedure Component Value Units Date/Time    CBC & Differential [[id number]]  (Abnormal) Collected: 01/16/25 1220    Specimen: Blood from Arm, Left Updated: 01/16/25 1233    Narrative:      The following orders were created for panel order CBC & Differential.  Procedure                               Abnormality         Status                     ---------                               -----------         ------                     CBC Auto Differential[[id number]]        Abnormal            Final result                 Please view results for these tests on the individual orders.    Comprehensive Metabolic Panel [[id number]]  (Abnormal) Collected: 01/16/25 1220    Specimen: Blood from Arm, Left Updated: 01/16/25 1250     Glucose 88 mg/dL      BUN 9 mg/dL      Creatinine 0.69 mg/dL      Sodium 135 mmol/L      Potassium 3.8 mmol/L      Chloride 102 mmol/L      CO2 21.3 mmol/L      Calcium 9.1 mg/dL      Total Protein 8.2 g/dL      Albumin 4.6 g/dL      ALT (SGPT) 14 U/L      AST (SGOT) 22 U/L      Alkaline Phosphatase 60 U/L      Total Bilirubin 0.6 mg/dL      Globulin 3.6 gm/dL      A/G Ratio 1.3 g/dL      BUN/Creatinine Ratio 13.0     Anion Gap 11.7 mmol/L       eGFR 123.7 mL/min/1.73     Narrative:      GFR Categories in Chronic Kidney Disease (CKD)      GFR Category          GFR (mL/min/1.73)    Interpretation  G1                     90 or greater         Normal or high (1)  G2                      60-89                Mild decrease (1)  G3a                   45-59                Mild to moderate decrease  G3b                   30-44                Moderate to severe decrease  G4                    15-29                Severe decrease  G5                    14 or less           Kidney failure          (1)In the absence of evidence of kidney disease, neither GFR category G1 or G2 fulfill the criteria for CKD.    eGFR calculation 2021 CKD-EPI creatinine equation, which does not include race as a factor    Lipase [[id number]]  (Normal) Collected: 01/16/25 1220    Specimen: Blood from Arm, Left Updated: 01/16/25 1250     Lipase 24 U/L     hCG, Quantitative, Pregnancy [[id number]] Collected: 01/16/25 1220    Specimen: Blood from Arm, Left Updated: 01/16/25 1250     HCG Quantitative <0.50 mIU/mL     Narrative:      HCG Ranges by Gestational Age    Females - non-pregnant premenopausal   </= 1mIU/mL HCG  Females - postmenopausal               </= 7mIU/mL HCG    3 Weeks       5.4   -      72 mIU/mL  4 Weeks      10.2   -     708 mIU/mL  5 Weeks       217   -   8,245 mIU/mL  6 Weeks       152   -  32,177 mIU/mL  7 Weeks     4,059   - 153,767 mIU/mL  8 Weeks    31,366   - 149,094 mIU/mL  9 Weeks    59,109   - 135,901 mIU/mL  10 Weeks   44,186   - 170,409 mIU/mL  12 Weeks   27,107   - 201,615 mIU/mL  14 Weeks   24,302   -  93,646 mIU/mL  15 Weeks   12,540   -  69,747 mIU/mL  16 Weeks    8,904   -  55,332 mIU/mL  17 Weeks    8,240   -  51,793 mIU/mL  18 Weeks    9,649   -  55,271 mIU/mL      CBC Auto Differential [[id number]]  (Abnormal) Collected: 01/16/25 1220    Specimen: Blood from Arm, Left Updated: 01/16/25 1233     WBC 9.21 10*3/mm3      RBC 4.35 10*6/mm3      Hemoglobin 12.0  g/dL      Hematocrit 38.4 %      MCV 88.3 fL      MCH 27.6 pg      MCHC 31.3 g/dL      RDW 13.9 %      RDW-SD 45.1 fl      MPV 13.0 fL      Platelets 201 10*3/mm3      Neutrophil % 69.6 %      Lymphocyte % 21.0 %      Monocyte % 7.6 %      Eosinophil % 0.8 %      Basophil % 0.8 %      Immature Grans % 0.2 %      Neutrophils, Absolute 6.42 10*3/mm3      Lymphocytes, Absolute 1.93 10*3/mm3      Monocytes, Absolute 0.70 10*3/mm3      Eosinophils, Absolute 0.07 10*3/mm3      Basophils, Absolute 0.07 10*3/mm3      Immature Grans, Absolute 0.02 10*3/mm3      nRBC 0.0 /100 WBC     Urinalysis With Microscopic If Indicated (No Culture) - Urine, Clean Catch [[id number]]  (Abnormal) Collected: 01/16/25 1221    Specimen: Urine, Clean Catch Updated: 01/16/25 1241     Color, UA Yellow     Appearance, UA Cloudy     pH, UA 6.5     Specific Gravity, UA 1.014     Glucose, UA Negative     Ketones, UA Negative     Bilirubin, UA Negative     Blood, UA Large (3+)     Protein, UA Negative     Leuk Esterase, UA Trace     Nitrite, UA Negative     Urobilinogen, UA 0.2 E.U./dL    Urinalysis, Microscopic Only - Urine, Clean Catch [[id number]]  (Abnormal) Collected: 01/16/25 1221    Specimen: Urine, Clean Catch Updated: 01/16/25 1318     RBC, UA 11-20 /HPF      WBC, UA 3-5 /HPF      Bacteria, UA 2+ /HPF      Squamous Epithelial Cells, UA 21-30 /HPF      Transitional Epithelial Cells, UA 3-6 /HPF      Hyaline Casts, UA None Seen /LPF      Methodology Automated Microscopy    Urine Culture - Urine, Urine, Clean Catch [[id number]] Collected: 01/16/25 1221    Specimen: Urine, Clean Catch Updated: 01/16/25 1445    Lactic Acid, Plasma [[id number]]  (Normal) Collected: 01/16/25 1350    Specimen: Blood from Arm, Right Updated: 01/16/25 1411     Lactate 2.0 mmol/L              Imaging:    CT Abdomen Pelvis Without Contrast    Result Date: 1/16/2025  CT ABDOMEN PELVIS WO CONTRAST Date of Exam: 1/16/2025 1:13 PM EST Indication: Left flank, history of kidney  stone. Comparison: None available. Technique: Axial CT images were obtained of the abdomen and pelvis without the administration of contrast. Reconstructed coronal and sagittal images were also obtained. Automated exposure control and iterative construction methods were used. Findings: The included lung bases show no acute abnormality. There is no suspicious hepatic lesion. Cholecystectomy. No significant biliary ductal dilation.. The spleen, pancreas, and bilateral adrenal glands are unremarkable. There is bilateral nephrolithiasis. There is left-sided hydronephrosis secondary to an obstructing stone measuring 5 mm at the left ureterovesicular junction as seen on series 3 image 182. No evidence of right-sided hydronephrosis.. There is no evidence of bowel obstruction. Appendix is normal. No suspicious lymphadenopathy. No abdominal aortic aneurysm. Urinary bladder is unremarkable. Normal uterus. Abdominal and pelvic wall soft tissues show no acute abnormality. There is no fracture or suspicious osseous lesion.      Impression: 1.Left-sided hydronephrosis secondary to an obstructing 5 mm stone at the left ureterovesicular junction. 2.Additional bilateral nephrolithiasis. Electronically Signed: Jatinder Inman MD  1/16/2025 1:43 PM EST  Workstation ID: [id number]       Differential Diagnosis and Discussion:    Abdominal Pain: Based on the patient's signs and symptoms, I considered abdominal aortic aneurysm, small bowel obstruction, pancreatitis, acute cholecystitis, acute appendecitis, peptic ulcer disease, gastritis, colitis, endocrine disorders, irritable bowel syndrome and other differential diagnosis an etiology of the patient's abdominal pain.  Flank Pain: Differential diagnosis includes but is not limited to kidney stones, pyelonephritis, musculoskeletal disorders, renal infarction, urinary tract infection, hydronephrosis, radiculopathy, aortic aneurysm, renal cell carcinoma.    PROCEDURES:    Labs were collected in  the emergency department and all labs were reviewed and interpreted by me.  CT scan was performed in the emergency department and the CT scan radiology impression was interpreted by me.    No orders to display       Procedures    MDM     Amount and/or Complexity of Data Reviewed  Clinical lab tests: ordered and reviewed  Tests in the radiology section of CPT®: ordered and reviewed  Review and summarize past medical records: yes  Discuss the patient with other providers: yes  Independent visualization of images, tracings, or specimens: yes    Risk of Complications, Morbidity, and/or Mortality  Presenting problems: low  Diagnostic procedures: moderate  Management options: moderate    Patient Progress  Patient progress: stable                       Patient Care Considerations:    SEPSIS was considered but is NOT present in the emergency department as SIRS criteria is not present.      Consultants/Shared Management Plan:    Consultant: I have discussed the case with Dr. Lawrence who agrees to consult on the patient.  After seeing patient, he will take her to the OR for cystoscopy and ureteroscopy with likely stent placement.    Social Determinants of Health:    Patient is independent, reliable, and has access to care.       Disposition and Care Coordination:    Send to OR/Endoscopy        Final diagnoses:   Left ureteral stone   Flank pain        ED Disposition       ED Disposition   Send to OR    Condition   --    Comment   --               This medical record created using voice recognition software.             Loren Weaver PA-C  01/16/25 [id number]

## 2025-01-16 NOTE — ANESTHESIA PREPROCEDURE EVALUATION
Anesthesia Evaluation     Patient summary reviewed and Nursing notes reviewed   history of anesthetic complications (Combative at emergence):   NPO Solid Status: > 8 hours  NPO Liquid Status: > 2 hours           Airway   Mallampati: I  TM distance: >3 FB  Neck ROM: full  Dental          Pulmonary - negative pulmonary ROS and normal exam   Cardiovascular - negative cardio ROS and normal exam  Exercise tolerance: good (4-7 METS)        Neuro/Psych  (+) headaches, psychiatric history Anxiety and Depression  GI/Hepatic/Renal/Endo    (+) renal disease- stones    Musculoskeletal (-) negative ROS    Abdominal  - normal exam   Substance History - negative use     OB/GYN negative ob/gyn ROS         Other - negative ROS       ROS/Med Hx Other: PAT Nursing Notes unavailable.               Anesthesia Plan    ASA 2     general     (No morphine/dilaudid)  intravenous induction     Anesthetic plan, risks, benefits, and alternatives have been provided, discussed and informed consent has been obtained with: patient.    Plan discussed with CRNA.    CODE STATUS:

## 2025-01-16 NOTE — H&P
[facility]   PREOPERATIVE HISTORY AND PHYSICAL    Patient Name:Cassy Causey  : 2000  MRN: [id number]  Primary Care Physician: System, Provider Not In  Date of admission: 2025    Subjective   Subjective     Chief Complaint: preoperative evaluation    Flank Pain  Associated symptoms include abdominal pain.   Abdominal Pain    Patient comes to the emergency room with complaints of left flank pain for several days.  She has had nausea and vomiting.  She denies any fevers or chills.  She has passed small fragments of the stone.  CT scan in the emergency room reveals a 5 mm stone in the left distal ureter at the UVJ.  She has had multiple stones in the past has had surgery before to extract the stone.  Cassy Causey is a 24 y.o. female who presents for preoperative evaluation. She is scheduled for surgery.    Review of Systems   Gastrointestinal:  Positive for abdominal pain.   Genitourinary:  Positive for flank pain.   Other than what is mentioned in HPI the review of systems is negative.    Personal History     Past Medical History:   Diagnosis Date    Alternating constipation and diarrhea     Anemia     Anxiety     Bilateral ovarian cysts     Chlamydia     IC (interstitial cystitis)     Intractable vomiting     Kidney stone     Migraine     Nonfunctioning gallbladder     Periumbilical abdominal pain        Past Surgical History:   Procedure Laterality Date    CHOLECYSTECTOMY WITH INTRAOPERATIVE CHOLANGIOGRAM N/A 2021    Procedure: CHOLECYSTECTOMY LAPAROSCOPIC INTRAOPERATIVE CHOLANGIOGRAM;  Surgeon: Hugh Eddy Jr., MD;  Location: [facility] OR [facility];  Service: General;  Laterality: N/A;    COLONOSCOPY N/A 2021    Procedure: COLONOSCOPY to cecum and ti with cold bxs;  Surgeon: Caesar Sorensen MD;  Location: [facility] ENDOSCOPY;  Service: Gastroenterology;  Laterality: N/A;  PRE: Abd Pain, Alternating constipation/diarrhea  POST: Normal    DENTAL PROCEDURE      ENDOSCOPY N/A 2021    Procedure:  ESOPHAGOGASTRODUODENOSCOPY with cold bxs;  Surgeon: Caesar Sorensen MD;  Location: [facility] ENDOSCOPY;  Service: Gastroenterology;  Laterality: N/A;  PRE: Abd Pain, N&V  POST: Gastritis    KIDNEY STONE SURGERY Right 08/2019    Body rejected stent that was placed.       Family History: Her family history includes Brain cancer in her father; Diabetes in her maternal grandfather; Hypertension in her father, maternal grandfather, and paternal grandfather.     Social History: She  reports that she has never smoked. She has never used smokeless tobacco. She reports current alcohol use. She reports that she does not use drugs.    Home Medications:  Fiber, HYDROcodone-acetaminophen, Lactobacillus, ketorolac, norethindrone-ethinyl estradiol-ferrous fumarate, and tamsulosin    Allergies:  She is allergic to dilaudid [hydromorphone] and morphine.    Objective    Objective     Vitals:    Temp:  [98 °F (36.7 °C)-99.1 °F (37.3 °C)] 99.1 °F (37.3 °C)  Heart Rate:  [76-97] 76  Resp:  [16-20] 16  BP: (124-134)/(82-93) 124/82    Physical Exam  She is awake alert oriented x 3  Extraocular muscles are intact oropharynx is clear  Heart regular rate and rhythm  Chest normal inspiration and expiration without shortness of breath or cough  Abdomen is soft nontender nondistended no rebound guarding or rigidity noted  There is left CVA tenderness  Extremities no clubbing cyanosis or edema  Neurological she is intact  Assessment & Plan   Assessment / Plan     Brief Patient Summary:  Cassy Causey is a 24 y.o. female who presents for preoperative evaluation.    * No surgery found *    Active Hospital Problems:  There are no active hospital problems to display for this patient.    Plan:   Patient is having intense left flank pain that is not controlled by pain medications.  I at length discussed with patient all the risks benefits alternatives all potential complications of watchful waiting versus proceeding with cystoscopy left ureteroscopy  holmium laser lithotripsy placement of a left ureteral stent.  Patient understands our discussion voices her understanding of our discussion and wishes to proceed with the procedure.    The risks, benefits, and alternatives of the procedure including but not limited to infection and risks of the anesthesia were discussed in detail with the patient and questions were answered. No guarantees were made or implied. Informed consent was obtained.    Mellissa Lawrence MD

## 2025-01-18 LAB — BACTERIA SPEC AEROBE CULT: NO GROWTH

## 2025-01-19 ENCOUNTER — NURSE TRIAGE (OUTPATIENT)
Dept: CALL CENTER | Facility: HOSPITAL | Age: 25
End: 2025-01-19
Payer: COMMERCIAL

## 2025-01-19 NOTE — TELEPHONE ENCOUNTER
"Kidney stone stent placed on 01/16 2025    Severe pain 10/10 Prescribed medication not helping Unable to urinate pain is so bad.    Advised to call surgeon if unable to reach surgeon return to the ED                           Reason for Disposition   [1] SEVERE post-op pain (e.g., excruciating, pain scale 8-10) AND [2] not controlled with pain medications    Additional Information   Negative: Sounds like a life-threatening emergency to the triager   Negative: Chest pain   Negative: Difficulty breathing   Negative: Acting confused (e.g., disoriented, slurred speech) or excessively sleepy   Negative: Post-Op tonsil and adenoid surgery, symptoms or questions about   Negative: Surgical incision symptoms and questions   Negative: [1] Pain or burning with passing urine (urination) AND [2] male   Negative: [1] Pain or burning with passing urine (urination) AND [2] female   Negative: Constipation   Negative: New or worsening leg (calf, thigh) pain   Negative: New or worsening leg swelling   Negative: Dizziness is severe, or persists > 24 hours after surgery   Negative: Pain, redness, swelling, or pus at IV Site   Negative: Symptoms arising from use of a urinary catheter (e.g., Coude, Lambert)   Negative: Cast problems or questions   Negative: Medication question   Negative: [1] Widespread rash AND [2] bright red, sunburn-like   Negative: [1] SEVERE headache AND [2] after spinal (epidural) anesthesia   Negative: [1] Vomiting AND [2] persists > 4 hours   Negative: [1] Vomiting AND [2] abdomen looks much more swollen than usual   Negative: [1] Drinking very little AND [2] dehydration suspected (e.g., no urine > 12 hours, very dry mouth, very lightheaded)   Negative: Patient sounds very sick or weak to the triager   Negative: Sounds like a serious complication to the triager   Negative: Fever > 100.4 F (38.0 C)    Answer Assessment - Initial Assessment Questions  1. SYMPTOM: \"What's the main symptom you're concerned about?\" " "(e.g., pain, fever, vomiting)        Pain left side abnd back at stent site.  2. ONSET: \"When did *No Answer*  start?\"      today  3. SURGERY: \"What surgery did you have?\"      *No Answer*CYSTOSCOPY, LEFT URETEROSCOPY, LASER LITHOTRIPSY, RETROGRADE PYELOGRAM, LEFT URETERAL STENT PLACEMENT  4. DATE of SURGERY: \"When was the surgery?\"      01/16/205  5. ANESTHESIA: \" What type of anesthesia did you have?\" (e.g., general, spinal, epidural, local)      *No Answer*  6. PAIN: \"Is there any pain?\" If Yes, ask: \"How bad is it?\"  (Scale 1-10; or mild, moderate, severe)        10/10  7. FEVER: \"Do you have a fever?\" If Yes, ask: \"What is your temperature, how was it measured, and when did it start?\"   no  8. VOMITING: \"Is there any vomiting?\" If Yes, ask: \"How many times?\"      no  9. BLEEDING: \"Is there any bleeding?\" If Yes, ask: \"How much?\" and \"Where?\"      hematuria  10. OTHER SYMPTOMS: \"Do you have any other symptoms?\" (e.g., drainage from wound, painful urination, constipation)      Unable to urinate due to pain    Protocols used: Post-Op Symptoms and Questions-ADULT-AH    "

## 2025-01-20 NOTE — TELEPHONE ENCOUNTER
Spoke with patient informing her that I will advise Dr. Garves for an appointment and about her pain She was prescribed oxybutynin, but pharmacy did not have it yet. She should get it soon.

## 2025-01-21 DIAGNOSIS — N20.0 KIDNEY STONE: Primary | ICD-10-CM

## 2025-01-21 RX ORDER — OXYCODONE AND ACETAMINOPHEN 5; 325 MG/1; MG/1
1 TABLET ORAL EVERY 4 HOURS PRN
Qty: 20 TABLET | Refills: 0 | Status: SHIPPED | OUTPATIENT
Start: 2025-01-21

## 2025-01-28 ENCOUNTER — TELEPHONE (OUTPATIENT)
Dept: UROLOGY | Age: 25
End: 2025-01-28
Payer: COMMERCIAL

## 2025-01-28 NOTE — TELEPHONE ENCOUNTER
PATIENT HAS AN APPOINTMENT FOR STENT REMOVAL TOMORROW.  SHE WANTS TO KNOW IF SHE NEEDS TO TAKE OFF WORK THE REST OF THE DAY, HOW THE PROCEDURE IS DONE, AND TO KNOW IF SHE NEEDS A .    #[phone]

## 2025-01-28 NOTE — TELEPHONE ENCOUNTER
Spoke with patient informing her that the cystoscope is placed into her bladder, the stent is grasped and removed. No  is needed.  Most patients experience no issues after the stent is removed.

## 2025-01-29 ENCOUNTER — PROCEDURE VISIT (OUTPATIENT)
Dept: UROLOGY | Age: 25
End: 2025-01-29
Payer: COMMERCIAL

## 2025-01-29 VITALS — HEIGHT: 65 IN | WEIGHT: 144 LBS | BODY MASS INDEX: 23.99 KG/M2

## 2025-01-29 DIAGNOSIS — N20.0 KIDNEY STONE: Primary | ICD-10-CM

## 2025-01-29 DIAGNOSIS — B37.31 YEAST VAGINITIS: ICD-10-CM

## 2025-01-29 LAB
BILIRUB BLD-MCNC: NEGATIVE MG/DL
CLARITY, POC: CLEAR
COLOR UR: YELLOW
EXPIRATION DATE: 1025
GLUCOSE UR STRIP-MCNC: NEGATIVE MG/DL
KETONES UR QL: NEGATIVE
LEUKOCYTE EST, POC: ABNORMAL
Lab: ABNORMAL
NITRITE UR-MCNC: NEGATIVE MG/ML
PH UR: 5.5 [PH] (ref 5–8)
PROT UR STRIP-MCNC: ABNORMAL MG/DL
RBC # UR STRIP: ABNORMAL /UL
SP GR UR: 1.03 (ref 1–1.03)
UROBILINOGEN UR QL: ABNORMAL

## 2025-01-29 RX ORDER — OXYBUTYNIN CHLORIDE 5 MG/1
1 TABLET ORAL 3 TIMES DAILY
COMMUNITY
Start: 2025-01-20

## 2025-01-29 RX ORDER — FLUCONAZOLE 150 MG/1
150 TABLET ORAL DAILY
Qty: 3 TABLET | Refills: 0 | Status: SHIPPED | OUTPATIENT
Start: 2025-01-29 | End: 2025-02-01

## 2025-01-29 NOTE — PROGRESS NOTES
Stent Removal    Date/Time: 1/29/2025 1:00 PM    Performed by: Cass Graves MD  Authorized by: Cass Graves MD  Preparation: Patient was prepped and draped in the usual sterile fashion.  Local anesthesia used: no    Anesthesia:  Local anesthesia used: no    Sedation:  Patient sedated: no    Patient tolerance: patient tolerated the procedure well with no immediate complications        After proper consent was obtained patient was placed into the supine position.  Flexible cystoscope was passed into the bladder.  The ureteral stent was grasped and removed.  The cystoscope was then removed.  Patient tolerated the procedure well.      Patient will follow back up in 1 year with a KUB or sooner if needed.  She did complain of yeast infection today in spite of symptoms of Diflucan for her.

## 2025-02-20 ENCOUNTER — OFFICE VISIT (OUTPATIENT)
Dept: OBSTETRICS AND GYNECOLOGY | Age: 25
End: 2025-02-20
Payer: COMMERCIAL

## 2025-02-20 VITALS
SYSTOLIC BLOOD PRESSURE: 110 MMHG | BODY MASS INDEX: 24.16 KG/M2 | WEIGHT: 145 LBS | DIASTOLIC BLOOD PRESSURE: 70 MMHG | HEIGHT: 65 IN

## 2025-02-20 DIAGNOSIS — Z11.3 SCREEN FOR STD (SEXUALLY TRANSMITTED DISEASE): ICD-10-CM

## 2025-02-20 DIAGNOSIS — Z01.419 WELL WOMAN EXAM WITH ROUTINE GYNECOLOGICAL EXAM: Primary | ICD-10-CM

## 2025-02-20 DIAGNOSIS — Z12.4 ENCOUNTER FOR PAPANICOLAOU SMEAR FOR CERVICAL CANCER SCREENING: ICD-10-CM

## 2025-02-20 DIAGNOSIS — R30.0 DYSURIA: ICD-10-CM

## 2025-02-20 DIAGNOSIS — N94.10 FEMALE DYSPAREUNIA: ICD-10-CM

## 2025-02-20 RX ORDER — DROSPIRENONE AND ESTETROL 3-14.2(28)
1 KIT ORAL DAILY
Qty: 30 TABLET | Refills: 3 | Status: SHIPPED | OUTPATIENT
Start: 2025-02-20

## 2025-02-20 RX ORDER — CHOLECALCIFEROL (VITAMIN D3) 25 MCG
1000 TABLET ORAL DAILY
COMMUNITY

## 2025-02-20 NOTE — PROGRESS NOTES
"Subjective     Chief Complaint   Patient presents with    Gynecologic Exam     New/re-establish care  Annual exam,   pt c/o possible endometriosis, pain with intercourse.    Last pap 2021 neg        History of Present Illness    Cassy Causey is a 24 y.o.  who presents for annual exam.    She is here to re establish care  I saw her last in 2021    Has been diagnosed with PMDD  Seeing a psychiatrist and and a therapist  Has been put on zoloft recently, 5 months ago  Was also on effexor  Feels \"psychotic\" the week before her period  She does feel a little better during her  period but then feels best the week after her period  She did do the gene sight testing and the zoloft and effexor was in the green  There was one other  medication but she doesn't recall what the pill was    She does take her vitamins and eats right to keep herself healthy    Period is painful  2nd and 3rd day are painful  Can be a 7 out of 10   Menses are irregular as well  Notes painful intercourse as well  Does note this partner is larger and that could be contributing factor     She is here with her JOSE and her niece (she is 9 months old)    Her menses are irregular, lasting 4-7 days, dysmenorrhea severe, occurring  2nd and 3rd days    Obstetric History:  OB History          0    Para   0    Term   0       0    AB   0    Living   0         SAB   0    IAB   0    Ectopic   0    Molar        Multiple   0    Live Births                   Menstrual History:     Patient's last menstrual period was 2025 (exact date).         Current contraception:  none  History of abnormal Pap smear: no  Received Gardasil immunization: no  Perform regular self breast exam: no  Family history of uterine or ovarian cancer: no  Family History of colon cancer: no  Family history of breast cancer: no    Mammogram: not indicated.  Colonoscopy: not indicated.  DEXA: not indicated.    Exercise: moderately active  Calcium/Vitamin D: " "adequate intake    The following portions of the patient's history were reviewed and updated as appropriate: allergies, current medications, past family history, past medical history, past social history, past surgical history, and problem list.    Review of Systems   Genitourinary:  Positive for dyspareunia, menstrual problem and pelvic pain.   All other systems reviewed and are negative.      Review of Systems   Constitutional: Negative for fatigue.   Respiratory: Negative for shortness of breath.    Gastrointestinal: Negative for abdominal pain.   Genitourinary: Negative for dysuria.   Neurological: Negative for headaches.   Psychiatric/Behavioral: Negative for dysphoric mood.         Objective   Physical Exam    /70   Ht 165.1 cm (65\")   Wt 65.8 kg (145 lb)   LMP 02/13/2025 (Exact Date)   BMI 24.13 kg/m²   General:   alert, comfortable, and no distress   Heart: regular rate and rhythm   Lungs: clear to auscultation bilaterally   Breast: normal appearance, no masses or tenderness, Inspection negative, No nipple retraction or dimpling, No nipple discharge or bleeding, No axillary or supraclavicular adenopathy, Normal to palpation without dominant masses, positive findings: implants bilaterally   Neck: no adenopathy and no carotid bruit   Abdomen: Not performed today   CVA: Not performed today   Pelvis: External genitalia: normal general appearance  Vaginal: normal mucosa without prolapse or lesions  Cervix: normal appearance, thin prep PAP obtained, and GC prep obtained  Adnexa: normal bimanual exam  Uterus: normal single, nontender   Extremities: Not performed today   Neurologic: Not performed today   Psychiatric: Normal affect, judgement, and mood     Assessment & Plan   Diagnoses and all orders for this visit:    1. Well woman exam with routine gynecological exam (Primary)    2. Encounter for Papanicolaou smear for cervical cancer screening  -     IGP, CtNg, Rfx Aptima HPV ASCU    3. Screen for STD " (sexually transmitted disease)  -     IGP, CtNg, Rfx Aptima HPV ASCU    4. Dysuria  -     POC Urinalysis Dipstick  -     Urine Culture - Urine, Urine, Clean Catch    5. Female dyspareunia  -     Urine Culture - Urine, Urine, Clean Catch    Other orders  -     Drospirenone-Estetrol (Nextstellis) 3-14.2 MG tablet; Take 1 tablet by mouth Daily.  Dispense: 30 tablet; Refill: 3        All questions answered.  Breast self exam technique reviewed and patient encouraged to perform self-exam monthly.  Discussed healthy lifestyle modifications.  Recommended 30 minutes of aerobic exercise five times per week.  Discussed calcium needs to prevent osteoporosis.      Pap and std testing done  UA and urine culture sent, does have h/o IC  Disc BC, will try BCP. This may help with her painful periods and PMDD.   C/w zoloft at this time. May try to taper off the zoloft if she finds that her sxs are improving   Plan pelvic u/s  Disc painful periods, could be endometriosis but disc difficulty in diagnosing this without a diagnostic lap. Could consider this if pills don't improve her sxs

## 2025-02-21 ENCOUNTER — TELEPHONE (OUTPATIENT)
Dept: OBSTETRICS AND GYNECOLOGY | Age: 25
End: 2025-02-21
Payer: COMMERCIAL

## 2025-02-21 NOTE — TELEPHONE ENCOUNTER
PT states that she was not given the Nextstellis samples when she left her appointment. PT was wondering if she can get a prescription sent to her pharmacy due to her living an hour 1/2 away. Please advise.

## 2025-02-23 LAB
BACTERIA UR CULT: ABNORMAL
BACTERIA UR CULT: ABNORMAL
OTHER ANTIBIOTIC SUSC ISLT: ABNORMAL

## 2025-02-24 ENCOUNTER — TELEPHONE (OUTPATIENT)
Dept: UROLOGY | Age: 25
End: 2025-02-24
Payer: COMMERCIAL

## 2025-02-24 RX ORDER — NITROFURANTOIN 25; 75 MG/1; MG/1
100 CAPSULE ORAL 2 TIMES DAILY
Qty: 14 CAPSULE | Refills: 0 | Status: SHIPPED | OUTPATIENT
Start: 2025-02-24 | End: 2025-03-03

## 2025-02-24 NOTE — TELEPHONE ENCOUNTER
Hub staff attempted to follow warm transfer process and was unsuccessful     Caller: Cassy Causey    Relationship to patient: Self    Best call back number: [phone] (home)       Patient is needing: CALLED TO REPORT 4TH UTI IN THE PAST 5 MONTHS. CALLED TO SPEAK W/ CLINICAL STAFF AND SCHEDULE APPT.

## 2025-02-25 LAB
C TRACH RRNA CVX QL NAA+PROBE: NEGATIVE
CONV .: NORMAL
CYTOLOGIST CVX/VAG CYTO: NORMAL
CYTOLOGY CVX/VAG DOC CYTO: NORMAL
CYTOLOGY CVX/VAG DOC THIN PREP: NORMAL
DX ICD CODE: NORMAL
N GONORRHOEA RRNA CVX QL NAA+PROBE: NEGATIVE
OTHER STN SPEC: NORMAL
SERVICE CMNT-IMP: NORMAL
STAT OF ADQ CVX/VAG CYTO-IMP: NORMAL

## 2025-02-28 NOTE — TELEPHONE ENCOUNTER
3RD CALL TO PT TO SCHEDULE APPT W/[facility] ON 3/6/25 AT 8 AM/LMOM/ANYTHING ELSE TO DO?? UNABLE TO CONTACT PT

## 2025-02-28 NOTE — TELEPHONE ENCOUNTER
I would think there is nothing else to do right now.  It looks like Dr. Lawrence and Dr. Graves have seen the patient previously for kidney stone removal.  I do not see a new referral for UTI/recurrent UTI.  If she had been referred specifically for UTI I would say to let the referring provider know, but if not, nothing else to do.  Thanks.

## 2025-03-03 ENCOUNTER — TELEPHONE (OUTPATIENT)
Dept: OBSTETRICS AND GYNECOLOGY | Age: 25
End: 2025-03-03

## 2025-03-03 ENCOUNTER — TELEPHONE (OUTPATIENT)
Dept: UROLOGY | Age: 25
End: 2025-03-03
Payer: COMMERCIAL

## 2025-03-03 RX ORDER — NORETHINDRONE ACETATE AND ETHINYL ESTRADIOL, ETHINYL ESTRADIOL AND FERROUS FUMARATE 1MG-10(24)
1 KIT ORAL DAILY
Qty: 28 TABLET | Refills: 3 | Status: SHIPPED | OUTPATIENT
Start: 2025-03-03

## 2025-03-03 NOTE — TELEPHONE ENCOUNTER
Caller: GRISELDA JOSEPH    Relationship: SELF    Best call back number: [phone]    What is the best time to reach you: EARLY MORNING    Who are you requesting to speak with (clinical staff, provider,  specific staff member): JACK    Do you know the name of the person who called: JACK    What was the call regarding: INCOMING CALL    Is it okay if the provider responds through MyChart: N/A

## 2025-03-03 NOTE — TELEPHONE ENCOUNTER
Caller: Cassy Causey    Relationship to patient: Self    Best call back number: [phone] (home)      Patient is needing: A CALL BACK FROM OFFICE, WENT TO  [facility], AND PHARMACY TOLD HER INSURANCE DOESN 'T WANT TO COVER IT SHE HAS TO TRY GENERIC FIRST.  PT ALSO WANTS TO KNOW IF YOU COULD HELP MANAGE HER ZOLOFT MED TOO.        [facility] - [address] [phone] [facility] [phone] FX

## 2025-03-04 NOTE — PROGRESS NOTES
Chief Complaint: frequecy uti (Hx of kidney stones and IC)    Subjective         History of Present Illness  Cassy Causey is a 24 y.o. female presents to [facility] to be seen for frequent UTI.    She reports recurrent UTI and bladder pain. She is getting approximately 3-4 UTIs per year currently.  Her last positive culture was done in February.  She reports that she never took any antibiotics after that culture.  She reports that she does have dysuria today.  She reports that her common symptoms of UTI include dysuria and low back pain.      She does have a history of IC as well.  She avoids bladder irritants including caffeine acidic food and drink and alcohol.      The patient also has a history of kidney stones.  She had a cystoscopy with left ureteroscopy, laser lithotripsy and retrograde pyelogram with left ureteral stent placement done on 1/16/2025 by Dr. Lawrence.  The patient was then seen in our office by Dr. Cass Graves on 1/20 9/2025 for stent removal.  At that visit, the patient was instructed to follow up in 1 year with a KUB or sooner if needed.  She does have orders in the system for that KUB.  Previous CT does show bilateral nephrolithiasis.  The patient reports that she has passed over 20 stones since the age of 17.  She has required 2 procedures for stone removal.  She reports that she is drinking plenty of fluids/water daily.      Frequency-denies     Urgency-admits, but reports that she usually on urinates a few times per day     Incontinence-denies     Nocturia-rarely     GH-denies     History of stones-admits, over 20, has passed many of the stones on her own, lithotripsy X 1, basket stone removal X 1      surgeries-lithotripsy, basket stone removal     Family history of  malignancy-denies     Cardiopulmonary-history of tachycardia     Anticoagulants-none     Smoker-denies     Urine culture  2/20/2025 50,000-100,000 CFU/mL Enterococcus faecalis resistant to  tetracycline, otherwise sensitive  1/16/2025 no growth    CT ABDOMEN PELVIS WO CONTRAST  Date of Exam: 1/16/2025 1:13 PM EST     Indication: Left flank, history of kidney stone.     Comparison: None available.      Findings:  The included lung bases show no acute abnormality.     There is no suspicious hepatic lesion.     Cholecystectomy. No significant biliary ductal dilation..     The spleen, pancreas, and bilateral adrenal glands are unremarkable.     There is bilateral nephrolithiasis. There is left-sided hydronephrosis secondary to an obstructing stone measuring 5 mm at the left ureterovesicular junction as seen on series 3 image 182. No evidence of right-sided hydronephrosis..     There is no evidence of bowel obstruction. Appendix is normal.     No suspicious lymphadenopathy. No abdominal aortic aneurysm.     Urinary bladder is unremarkable. Normal uterus.     Abdominal and pelvic wall soft tissues show no acute abnormality. There is no fracture or suspicious osseous lesion.     IMPRESSION:  Impression:  1.Left-sided hydronephrosis secondary to an obstructing 5 mm stone at the left ureterovesicular junction.  2.Additional bilateral nephrolithiasis.     Electronically Signed: Jatinder Inman MD    1/16/2025 1:43 PM EST     Objective     Past Medical History:   Diagnosis Date    Alternating constipation and diarrhea     Anemia     Anxiety     Bilateral ovarian cysts     Chlamydia     IC (interstitial cystitis)     Intractable vomiting     Kidney stone     Migraine     Nonfunctioning gallbladder     Periumbilical abdominal pain        Past Surgical History:   Procedure Laterality Date    CHOLECYSTECTOMY WITH INTRAOPERATIVE CHOLANGIOGRAM N/A 9/24/2021    Procedure: CHOLECYSTECTOMY LAPAROSCOPIC INTRAOPERATIVE CHOLANGIOGRAM;  Surgeon: Hugh Eddy Jr., MD;  Location: [facility] OR [facility];  Service: General;  Laterality: N/A;    COLONOSCOPY N/A 8/2/2021    Procedure: COLONOSCOPY to cecum and ti with cold bxs;  Surgeon:  Caesar Sorensen MD;  Location: [facility] ENDOSCOPY;  Service: Gastroenterology;  Laterality: N/A;  PRE: Abd Pain, Alternating constipation/diarrhea  POST: Normal    DENTAL PROCEDURE      ENDOSCOPY N/A 8/2/2021    Procedure: ESOPHAGOGASTRODUODENOSCOPY with cold bxs;  Surgeon: Ceasar Sorensen MD;  Location: [facility] ENDOSCOPY;  Service: Gastroenterology;  Laterality: N/A;  PRE: Abd Pain, N&V  POST: Gastritis    KIDNEY STONE SURGERY Right 08/2019    Body rejected stent that was placed.    URETEROSCOPY LASER LITHOTRIPSY WITH STENT INSERTION Left 1/16/2025    Procedure: CYSTOSCOPY, LEFT URETEROSCOPY, LASER LITHOTRIPSY, RETROGRADE PYELOGRAM, LEFT URETERAL STENT PLACEMENT;  Surgeon: Mellissa Lawrence MD;  Location: [facility] OR;  Service: Urology;  Laterality: Left;         Current Outpatient Medications:     Cholecalciferol 25 MCG (1000 UT) tablet, Take 1 tablet by mouth Daily., Disp: , Rfl:     Magnesium 100 MG capsule, Take  by mouth., Disp: , Rfl:     Norethin-Eth Estrad-Fe Biphas (Lo Loestrin Fe) 1 MG-10 MCG / 10 MCG tablet, Take 1 tablet by mouth Daily., Disp: 28 tablet, Rfl: 3    sertraline (ZOLOFT) 25 MG tablet, Take 1 tablet by mouth Daily., Disp: , Rfl:     Allergies   Allergen Reactions    Dilaudid [Hydromorphone] Other (See Comments)     Pt reports causes severe stomach cramps    Morphine Other (See Comments)     Causes severe stomach spasms per pt.     Pyridium [Phenazopyridine] Other (See Comments)     Worsening spasms         Family History   Problem Relation Age of Onset    Brain cancer Father     Hypertension Father     Hypertension Paternal Grandfather     Diabetes Maternal Grandfather     Hypertension Maternal Grandfather     Breast cancer Neg Hx     Ovarian cancer Neg Hx     Uterine cancer Neg Hx     Colon cancer Neg Hx     Deep vein thrombosis Neg Hx     Pulmonary embolism Neg Hx     Malig Hyperthermia Neg Hx        Social History     Socioeconomic History    Marital status: Single   Tobacco Use    Smoking  "status: Never     Passive exposure: Never    Smokeless tobacco: Never   Vaping Use    Vaping status: Never Used   Substance and Sexual Activity    Alcohol use: Yes     Comment: occ    Drug use: No    Sexual activity: Yes     Partners: Male     Birth control/protection: OCP       Vital Signs:   Resp 16   Ht 165.1 cm (65\")   Wt 65.8 kg (145 lb)   BMI 24.13 kg/m²      Physical Exam  Vitals and nursing note reviewed.   Constitutional:       General: She is not in acute distress.     Appearance: Normal appearance. She is not toxic-appearing.   HENT:      Head: Normocephalic and atraumatic.      Right Ear: Tympanic membrane, ear canal and external ear normal.      Left Ear: Tympanic membrane, ear canal and external ear normal.      Nose: Nose normal.      Mouth/Throat:      Mouth: Mucous membranes are moist.      Pharynx: Oropharynx is clear.   Eyes:      Extraocular Movements: Extraocular movements intact.      Conjunctiva/sclera: Conjunctivae normal.      Pupils: Pupils are equal, round, and reactive to light.   Cardiovascular:      Rate and Rhythm: Normal rate and regular rhythm.      Pulses: Normal pulses.      Heart sounds: Normal heart sounds. No murmur heard.     No gallop.   Pulmonary:      Effort: Pulmonary effort is normal.      Breath sounds: Normal breath sounds.   Abdominal:      General: Bowel sounds are normal.      Palpations: Abdomen is soft.   Musculoskeletal:         General: Normal range of motion.      Cervical back: Normal range of motion and neck supple.   Skin:     General: Skin is warm and dry.   Neurological:      General: No focal deficit present.      Mental Status: She is alert and oriented to person, place, and time.   Psychiatric:         Mood and Affect: Mood normal.         Behavior: Behavior normal.          Result Review :   The following data was reviewed by: IVY Croft on 03/06/2025:  Results for orders placed or performed in visit on 03/06/25   Bladder Scan    Collection " Time: 03/06/25  8:23 AM   Result Value Ref Range    Urine Volume O ML      Bladder Scan interpretation 03/06/2025    Estimation of residual urine via LeiyooI 3000 Verathon Bladder Scan  MA/nurse performing: HAYDEE Contreras  Residual Urine: 0 ml  Indication: Recurrent UTI    Dysuria    Nephrolithiasis    Interstitial cystitis   Position: Supine  Examination: Incremental scanning of the suprapubic area using 2.0 MHz transducer using copious amounts of acoustic gel.   Findings: An anechoic area was demonstrated which represented the bladder, with measurement of residual urine as noted. I inspected this myself. In that the residual urine was stable or insignificant, refer to plan for treatment and plan necessary at this time.              Procedures        Assessment and Plan    Diagnoses and all orders for this visit:    1. Recurrent UTI (Primary)  -     Bladder Scan    2. Dysuria  -     Cancel: Urine Culture - Urine, Urine, Catheter; Future  -     Urine Culture - Urine, Urine, Clean Catch; Future  -     Urine Culture - Urine, Urine, Clean Catch    3. Nephrolithiasis    4. Interstitial cystitis    Discussed methods to help prevent urinary tract infections.  Encouraged the patient to contact our office anytime that she has symptoms of a UTI so that we can order a urine culture and treat as appropriate.  We are going to send her urine off to the lab today for urine culture.  Recommend that the patient start a daily probiotic for vaginal/urological health.    Discussed IC.  Recommend continued avoidance of bladder irritants, adherence to IC diet.    Discussed methods to help prevent kidney stone formation and growth.  Patient to increase her fluid intake to at least 2.5 L/day.  Patient can add citrus to her diet and recommended Dash/low oxalate diet as well.    Educational handouts provided to the patient regarding UTI prevention, IC, IC diet, kidney stone prevention, low oxalate diet.    Will plan to see the patient back in the  office in 6 months or sooner if needed for any worsening symptoms or new concerns.  Will plan for KUB in about 10 months for reevaluation of kidney stones.      Follow Up   Return in about 6 months (around 9/6/2025).  Patient was given instructions and counseling regarding her condition or for health maintenance advice. Please see specific information pulled into the AVS if appropriate.         This document has been electronically signed by IVY Croft  March 6, 2025 08:54 EST

## 2025-03-06 ENCOUNTER — OFFICE VISIT (OUTPATIENT)
Dept: UROLOGY | Age: 25
End: 2025-03-06
Payer: COMMERCIAL

## 2025-03-06 VITALS — WEIGHT: 145 LBS | HEIGHT: 65 IN | RESPIRATION RATE: 16 BRPM | BODY MASS INDEX: 24.16 KG/M2

## 2025-03-06 DIAGNOSIS — R30.0 DYSURIA: ICD-10-CM

## 2025-03-06 DIAGNOSIS — N30.10 INTERSTITIAL CYSTITIS: ICD-10-CM

## 2025-03-06 DIAGNOSIS — N20.0 NEPHROLITHIASIS: ICD-10-CM

## 2025-03-06 DIAGNOSIS — N39.0 RECURRENT UTI: Primary | ICD-10-CM

## 2025-03-06 LAB — URINE VOLUME: NORMAL

## 2025-03-06 PROCEDURE — 87086 URINE CULTURE/COLONY COUNT: CPT | Performed by: NURSE PRACTITIONER

## 2025-03-07 ENCOUNTER — TELEPHONE (OUTPATIENT)
Dept: UROLOGY | Age: 25
End: 2025-03-07
Payer: COMMERCIAL

## 2025-03-07 LAB — BACTERIA SPEC AEROBE CULT: NO GROWTH

## 2025-03-07 NOTE — TELEPHONE ENCOUNTER
----- Message from Imelda Daley sent at 3/7/2025  1:19 PM EST -----  Please let the patient know that her urine culture is negative and she does not have a UTI.  Thanks.

## 2025-05-23 RX ORDER — NORETHINDRONE ACETATE AND ETHINYL ESTRADIOL, ETHINYL ESTRADIOL AND FERROUS FUMARATE 1MG-10(24)
1 KIT ORAL DAILY
Qty: 28 TABLET | Refills: 3 | Status: SHIPPED | OUTPATIENT
Start: 2025-05-23

## 2025-05-23 NOTE — TELEPHONE ENCOUNTER
Caller: Cassy Causey    Relationship: Self    Best call back number: [phone]    Requested Prescriptions:   Requested Prescriptions     Pending Prescriptions Disp Refills    Norethin-Eth Estrad-Fe Biphas (Lo Loestrin Fe) 1 MG-10 MCG / 10 MCG tablet 28 tablet 3     Sig: Take 1 tablet by mouth Daily.        Pharmacy where request should be sent: [facility] [id number] [address] [phone] [facility] [phone] FX     Last office visit with prescribing clinician: 2/20/2025       Additional details provided by patient: PT CALLED STATED SHE NEEDS A REFILL SENT TO A DIFFERENT PHARMACY; UPDATED PHARMACY.    Does the patient have less than a 3 day supply:  [x] Yes  [] No    Would you like a call back once the refill request has been completed: [x] Yes [] No    If the office needs to give you a call back, can they leave a voicemail: [x] Yes [] No    Jalyn Solis   05/23/25 14:44 EDT

## (undated) DEVICE — SUT VIC 0 TN 27IN DYED JTN0G

## (undated) DEVICE — TOWEL,OR,DSP,ST,BLUE,STD,4/PK,20PK/CS: Brand: MEDLINE

## (undated) DEVICE — FIBR LASR HOLMIUM EMPOWER 365MH DISP EA/5

## (undated) DEVICE — BASIC SINGLE BASIN-LF: Brand: MEDLINE INDUSTRIES, INC.

## (undated) DEVICE — CATH URETRL OPEN END W/CONNECT 5F 70CM

## (undated) DEVICE — SINGLE-USE BIOPSY FORCEPS: Brand: RADIAL JAW 4

## (undated) DEVICE — PK LAP CHOLE BG

## (undated) DEVICE — SYS IRR PUMP SGL ACTN VAC SYR 10CC

## (undated) DEVICE — SUT MNCRYL 4/0 PS2 18 IN

## (undated) DEVICE — NITINOL WIRE WITH HYDROPHILIC TIP: Brand: SENSOR

## (undated) DEVICE — TUBING, SUCTION, 1/4" X 10', STRAIGHT: Brand: MEDLINE

## (undated) DEVICE — ENDOPOUCH RETRIEVER SPECIMEN RETRIEVAL BAGS: Brand: ENDOPOUCH RETRIEVER

## (undated) DEVICE — VISUALIZATION SYSTEM: Brand: CLEARIFY

## (undated) DEVICE — SOL IRR NACL 0.9PCT 3000ML

## (undated) DEVICE — URETERAL ACCESS SHEATH SET: Brand: NAVIGATOR HD

## (undated) DEVICE — SENSR O2 OXIMAX FNGR A/ 18IN NONSTR

## (undated) DEVICE — CYSTO PACK: Brand: MEDLINE INDUSTRIES, INC.

## (undated) DEVICE — CATH 2L URETRL HC 6F 50CM

## (undated) DEVICE — DRP C/ARM 41X74IN

## (undated) DEVICE — ENDOPATH PNEUMONEEDLE INSUFFLATION NEEDLES WITH LUER LOCK CONNECTORS 120MM: Brand: ENDOPATH

## (undated) DEVICE — SKIN PREP TRAY W/CHG: Brand: MEDLINE INDUSTRIES, INC.

## (undated) DEVICE — URETERAL STENT
Type: IMPLANTABLE DEVICE | Site: URETER | Status: NON-FUNCTIONAL
Brand: ASCERTA™

## (undated) DEVICE — APPL CHLORAPREP HI/LITE 26ML ORNG

## (undated) DEVICE — SYR LUERLOK 20CC BX/50

## (undated) DEVICE — ENDOPATH XCEL UNIVERSAL TROCAR STABLILITY SLEEVES: Brand: ENDOPATH XCEL

## (undated) DEVICE — Device

## (undated) DEVICE — 3M™ STERI-STRIP™ COMPOUND BENZOIN TINCTURE 40 BAGS/CARTON 4 CARTONS/CASE C1544: Brand: 3M™ STERI-STRIP™

## (undated) DEVICE — GLOVE,SURG,SENSICARE SLT,LF,PF,7.5: Brand: MEDLINE

## (undated) DEVICE — CANN O2 ETCO2 FITS ALL CONN CO2 SMPL A/ 7IN DISP LF

## (undated) DEVICE — ENDOPATH XCEL BLADELESS TROCARS WITH STABILITY SLEEVES: Brand: ENDOPATH XCEL

## (undated) DEVICE — PATIENT RETURN ELECTRODE, SINGLE-USE, CONTACT QUALITY MONITORING, ADULT, WITH 9FT CORD, FOR PATIENTS WEIGING OVER 33LBS. (15KG): Brand: MEGADYNE

## (undated) DEVICE — KT ORCA ORCAPOD DISP STRL

## (undated) DEVICE — SET, IRRIGATION CYSTO, Y-TYPE, 81": Brand: MEDLINE

## (undated) DEVICE — ADAPT CLN BIOGUARD AIR/H2O DISP

## (undated) DEVICE — LN SMPL CO2 SHTRM SD STREAM W/M LUER

## (undated) DEVICE — BITEBLOCK OMNI BLOC

## (undated) DEVICE — SYR LUERLOK 5CC

## (undated) DEVICE — GLV SURG PREMIERPRO ORTHO LTX PF SZ7.5 BRN